# Patient Record
Sex: FEMALE | Race: WHITE | Employment: FULL TIME | URBAN - METROPOLITAN AREA
[De-identification: names, ages, dates, MRNs, and addresses within clinical notes are randomized per-mention and may not be internally consistent; named-entity substitution may affect disease eponyms.]

---

## 2017-01-26 RX ORDER — HYDROCODONE BITARTRATE AND ACETAMINOPHEN 5; 325 MG/1; MG/1
1 TABLET ORAL 2 TIMES DAILY PRN
Qty: 60 TABLET | Refills: 0 | Status: SHIPPED | OUTPATIENT
Start: 2017-01-26 | End: 2017-04-07 | Stop reason: SDUPTHER

## 2017-01-26 RX ORDER — CYCLOBENZAPRINE HCL 5 MG
5 TABLET ORAL 2 TIMES DAILY PRN
Qty: 60 TABLET | Refills: 1 | Status: SHIPPED | OUTPATIENT
Start: 2017-01-26 | End: 2020-11-25 | Stop reason: ALTCHOICE

## 2017-01-27 ENCOUNTER — TELEPHONE (OUTPATIENT)
Dept: INTERNAL MEDICINE CLINIC | Age: 57
End: 2017-01-27

## 2017-01-27 RX ORDER — AZITHROMYCIN 250 MG/1
TABLET, FILM COATED ORAL
Qty: 1 PACKET | Refills: 0 | Status: SHIPPED | OUTPATIENT
Start: 2017-01-27 | End: 2017-02-06

## 2017-02-04 ENCOUNTER — HOSPITAL ENCOUNTER (OUTPATIENT)
Dept: LAB | Age: 57
Discharge: HOME | End: 2017-02-04
Attending: INTERNAL MEDICINE | Admitting: INTERNAL MEDICINE

## 2017-02-04 LAB
ALBUMIN SERPL-MCNC: 4.4 GM/DL (ref 3.4–5)
ALP BLD-CCNC: 111 IU/L (ref 40–129)
ALT SERPL-CCNC: 20 U/L (ref 10–40)
ANION GAP SERPL CALCULATED.3IONS-SCNC: 14 MMOL/L (ref 4–16)
AST SERPL-CCNC: 23 IU/L (ref 15–37)
BASOPHILS ABSOLUTE: 0.1 K/CU MM
BASOPHILS RELATIVE PERCENT: 1 % (ref 0–1)
BILIRUB SERPL-MCNC: 0.3 MG/DL (ref 0–1)
BILIRUBIN DIRECT: 0.2 MG/DL (ref 0–0.3)
BILIRUBIN, INDIRECT: 0.1 MG/DL (ref 0–0.7)
BUN BLDV-MCNC: 19 MG/DL (ref 6–23)
CALCIUM SERPL-MCNC: 9.8 MG/DL (ref 8.3–10.6)
CHLORIDE BLD-SCNC: 99 MMOL/L (ref 99–110)
CHOLESTEROL: 199 MG/DL
CO2: 28 MMOL/L (ref 21–32)
CREAT SERPL-MCNC: 0.8 MG/DL (ref 0.6–1.1)
DIFFERENTIAL TYPE: ABNORMAL
EOSINOPHILS ABSOLUTE: 0.2 K/CU MM
EOSINOPHILS RELATIVE PERCENT: 2.8 % (ref 0–3)
GFR AFRICAN AMERICAN: >60 ML/MIN/1.73M2
GFR NON-AFRICAN AMERICAN: >60 ML/MIN/1.73M2
GLUCOSE BLD-MCNC: 102 MG/DL (ref 70–140)
HCT VFR BLD CALC: 47.4 % (ref 37–47)
HDLC SERPL-MCNC: 61 MG/DL
HEMOGLOBIN: 14.9 GM/DL (ref 12.5–16)
IMMATURE NEUTROPHIL %: 0.5 % (ref 0–0.43)
LDL CHOLESTEROL DIRECT: 129 MG/DL
LYMPHOCYTES ABSOLUTE: 2.5 K/CU MM
LYMPHOCYTES RELATIVE PERCENT: 40.2 % (ref 24–44)
MCH RBC QN AUTO: 28.5 PG (ref 27–31)
MCHC RBC AUTO-ENTMCNC: 31.4 % (ref 32–36)
MCV RBC AUTO: 90.8 FL (ref 78–100)
MONOCYTES ABSOLUTE: 0.5 K/CU MM
MONOCYTES RELATIVE PERCENT: 8.9 % (ref 0–4)
NUCLEATED RBC %: 0 %
PDW BLD-RTO: 13.3 % (ref 11.7–14.9)
PLATELET # BLD: 432 K/CU MM (ref 140–440)
PMV BLD AUTO: 9.3 FL (ref 7.5–11.1)
POTASSIUM SERPL-SCNC: 4.3 MMOL/L (ref 3.5–5.1)
RBC # BLD: 5.22 M/CU MM (ref 4.2–5.4)
SEGMENTED NEUTROPHILS ABSOLUTE COUNT: 2.8 K/CU MM
SEGMENTED NEUTROPHILS RELATIVE PERCENT: 46.6 % (ref 36–66)
SODIUM BLD-SCNC: 141 MMOL/L (ref 135–145)
TOTAL IMMATURE NEUTOROPHIL: 0.03 K/CU MM
TOTAL NUCLEATED RBC: 0 K/CU MM
TOTAL PROTEIN: 8 GM/DL (ref 6.4–8.2)
TRIGL SERPL-MCNC: 110 MG/DL
TSH HIGH SENSITIVITY: 3.84 UIU/ML (ref 0.27–4.2)
VITAMIN D 25-HYDROXY: >60 NG/ML
WBC # BLD: 6.1 K/CU MM (ref 4–10.5)

## 2017-02-07 ENCOUNTER — OFFICE VISIT (OUTPATIENT)
Dept: INTERNAL MEDICINE CLINIC | Age: 57
End: 2017-02-07

## 2017-02-07 DIAGNOSIS — F41.1 GENERALIZED ANXIETY DISORDER: Primary | ICD-10-CM

## 2017-02-07 DIAGNOSIS — E03.4 HYPOTHYROIDISM DUE TO ACQUIRED ATROPHY OF THYROID: ICD-10-CM

## 2017-02-07 DIAGNOSIS — L98.9 ARM SKIN LESION, RIGHT: ICD-10-CM

## 2017-02-07 DIAGNOSIS — E78.2 MIXED HYPERLIPIDEMIA: ICD-10-CM

## 2017-02-07 DIAGNOSIS — E55.9 VITAMIN D DEFICIENCY: ICD-10-CM

## 2017-02-07 PROCEDURE — 99213 OFFICE O/P EST LOW 20 MIN: CPT | Performed by: INTERNAL MEDICINE

## 2017-02-14 VITALS
BODY MASS INDEX: 25.78 KG/M2 | SYSTOLIC BLOOD PRESSURE: 140 MMHG | RESPIRATION RATE: 14 BRPM | WEIGHT: 150.2 LBS | DIASTOLIC BLOOD PRESSURE: 90 MMHG | HEART RATE: 64 BPM

## 2017-04-10 RX ORDER — SUMATRIPTAN 100 MG/1
100 TABLET, FILM COATED ORAL
Qty: 4 TABLET | Refills: 5 | Status: SHIPPED | OUTPATIENT
Start: 2017-04-10 | End: 2017-09-12 | Stop reason: SDUPTHER

## 2017-04-10 RX ORDER — HYDROCODONE BITARTRATE AND ACETAMINOPHEN 5; 325 MG/1; MG/1
1 TABLET ORAL 2 TIMES DAILY PRN
Qty: 60 TABLET | Refills: 0 | Status: SHIPPED | OUTPATIENT
Start: 2017-04-10 | End: 2017-07-17 | Stop reason: SDUPTHER

## 2017-04-27 RX ORDER — IBUPROFEN 600 MG/1
TABLET ORAL
Qty: 180 TABLET | Refills: 0 | Status: SHIPPED | OUTPATIENT
Start: 2017-04-27 | End: 2017-08-01 | Stop reason: SDUPTHER

## 2017-06-21 RX ORDER — PHENAZOPYRIDINE HYDROCHLORIDE 100 MG/1
100 TABLET, FILM COATED ORAL 3 TIMES DAILY PRN
Qty: 30 TABLET | Refills: 0 | Status: SHIPPED | OUTPATIENT
Start: 2017-06-21 | End: 2017-09-12 | Stop reason: SDUPTHER

## 2017-07-17 RX ORDER — HYDROCODONE BITARTRATE AND ACETAMINOPHEN 5; 325 MG/1; MG/1
1 TABLET ORAL 2 TIMES DAILY PRN
Qty: 60 TABLET | Refills: 0 | Status: SHIPPED | OUTPATIENT
Start: 2017-07-17 | End: 2017-09-12 | Stop reason: SDUPTHER

## 2017-07-17 RX ORDER — LORAZEPAM 0.5 MG/1
1 TABLET ORAL NIGHTLY
Qty: 30 TABLET | Refills: 2 | Status: SHIPPED | OUTPATIENT
Start: 2017-07-17 | End: 2018-03-07 | Stop reason: SDUPTHER

## 2017-07-27 ENCOUNTER — TELEPHONE (OUTPATIENT)
Dept: INTERNAL MEDICINE CLINIC | Age: 57
End: 2017-07-27

## 2017-07-27 DIAGNOSIS — E78.2 MIXED HYPERLIPIDEMIA: ICD-10-CM

## 2017-07-27 DIAGNOSIS — D50.9 IRON DEFICIENCY ANEMIA, UNSPECIFIED IRON DEFICIENCY ANEMIA TYPE: Primary | ICD-10-CM

## 2017-07-27 DIAGNOSIS — E03.4 HYPOTHYROIDISM DUE TO ACQUIRED ATROPHY OF THYROID: ICD-10-CM

## 2017-08-01 RX ORDER — IBUPROFEN 600 MG/1
TABLET ORAL
Qty: 180 TABLET | Refills: 0 | Status: SHIPPED | OUTPATIENT
Start: 2017-08-01 | End: 2017-11-11 | Stop reason: SDUPTHER

## 2017-09-07 ENCOUNTER — HOSPITAL ENCOUNTER (OUTPATIENT)
Dept: OTHER | Age: 57
Discharge: HOME | End: 2017-09-07
Attending: INTERNAL MEDICINE | Admitting: INTERNAL MEDICINE

## 2017-09-07 LAB
ALBUMIN SERPL-MCNC: 4.1 GM/DL (ref 3.4–5)
ALP BLD-CCNC: 80 IU/L (ref 40–129)
ALT SERPL-CCNC: 15 U/L (ref 10–40)
ANION GAP SERPL CALCULATED.3IONS-SCNC: 12 MMOL/L (ref 4–16)
AST SERPL-CCNC: 22 IU/L (ref 15–37)
BASOPHILS ABSOLUTE: 0.1 K/CU MM
BASOPHILS RELATIVE PERCENT: 0.8 % (ref 0–1)
BILIRUB SERPL-MCNC: 0.3 MG/DL (ref 0–1)
BILIRUBIN DIRECT: 0.2 MG/DL (ref 0–0.3)
BILIRUBIN, INDIRECT: 0.1 MG/DL (ref 0–0.7)
BUN BLDV-MCNC: 18 MG/DL (ref 6–23)
CALCIUM SERPL-MCNC: 9.6 MG/DL (ref 8.3–10.6)
CHLORIDE BLD-SCNC: 99 MMOL/L (ref 99–110)
CHOLESTEROL: 189 MG/DL
CO2: 27 MMOL/L (ref 21–32)
CREAT SERPL-MCNC: 0.8 MG/DL (ref 0.6–1.1)
DIFFERENTIAL TYPE: ABNORMAL
EOSINOPHILS ABSOLUTE: 0.2 K/CU MM
EOSINOPHILS RELATIVE PERCENT: 2.3 % (ref 0–3)
GFR AFRICAN AMERICAN: >60 ML/MIN/1.73M2
GFR NON-AFRICAN AMERICAN: >60 ML/MIN/1.73M2
GLUCOSE BLD-MCNC: 99 MG/DL (ref 70–140)
HCT VFR BLD CALC: 35.5 % (ref 37–47)
HDLC SERPL-MCNC: 68 MG/DL
HEMOGLOBIN: 10.5 GM/DL (ref 12.5–16)
IMMATURE NEUTROPHIL %: 0.4 % (ref 0–0.43)
LDL CHOLESTEROL DIRECT: 114 MG/DL
LYMPHOCYTES ABSOLUTE: 1.5 K/CU MM
LYMPHOCYTES RELATIVE PERCENT: 16.5 % (ref 24–44)
MCH RBC QN AUTO: 22.1 PG (ref 27–31)
MCHC RBC AUTO-ENTMCNC: 29.6 % (ref 32–36)
MCV RBC AUTO: 74.6 FL (ref 78–100)
MONOCYTES ABSOLUTE: 0.6 K/CU MM
MONOCYTES RELATIVE PERCENT: 6.6 % (ref 0–4)
NUCLEATED RBC %: 0 %
PDW BLD-RTO: 16.2 % (ref 11.7–14.9)
PLATELET # BLD: 429 K/CU MM (ref 140–440)
PMV BLD AUTO: 9 FL (ref 7.5–11.1)
POTASSIUM SERPL-SCNC: 5 MMOL/L (ref 3.5–5.1)
RBC # BLD: 4.76 M/CU MM (ref 4.2–5.4)
SEGMENTED NEUTROPHILS ABSOLUTE COUNT: 6.7 K/CU MM
SEGMENTED NEUTROPHILS RELATIVE PERCENT: 73.4 % (ref 36–66)
SODIUM BLD-SCNC: 138 MMOL/L (ref 135–145)
TOTAL IMMATURE NEUTOROPHIL: 0.04 K/CU MM
TOTAL NUCLEATED RBC: 0 K/CU MM
TOTAL PROTEIN: 7.5 GM/DL (ref 6.4–8.2)
TRIGL SERPL-MCNC: 112 MG/DL
TSH HIGH SENSITIVITY: 1.58 UIU/ML (ref 0.27–4.2)
WBC # BLD: 9.1 K/CU MM (ref 4–10.5)

## 2017-09-12 ENCOUNTER — OFFICE VISIT (OUTPATIENT)
Dept: INTERNAL MEDICINE CLINIC | Age: 57
End: 2017-09-12

## 2017-09-12 VITALS
HEART RATE: 88 BPM | WEIGHT: 147 LBS | BODY MASS INDEX: 25.23 KG/M2 | DIASTOLIC BLOOD PRESSURE: 80 MMHG | SYSTOLIC BLOOD PRESSURE: 135 MMHG

## 2017-09-12 DIAGNOSIS — E78.2 MIXED HYPERLIPIDEMIA: ICD-10-CM

## 2017-09-12 DIAGNOSIS — R31.9 HEMATURIA: ICD-10-CM

## 2017-09-12 DIAGNOSIS — G89.29 OTHER CHRONIC PAIN: ICD-10-CM

## 2017-09-12 DIAGNOSIS — D50.9 IRON DEFICIENCY ANEMIA, UNSPECIFIED IRON DEFICIENCY ANEMIA TYPE: Primary | ICD-10-CM

## 2017-09-12 DIAGNOSIS — E03.4 HYPOTHYROIDISM DUE TO ACQUIRED ATROPHY OF THYROID: ICD-10-CM

## 2017-09-12 DIAGNOSIS — M81.0 AGE-RELATED OSTEOPOROSIS WITHOUT CURRENT PATHOLOGICAL FRACTURE: ICD-10-CM

## 2017-09-12 LAB
BASOPHILS ABSOLUTE: 0.1 K/UL (ref 0–0.2)
BASOPHILS RELATIVE PERCENT: 1.1 %
BILIRUBIN URINE: NEGATIVE
BLOOD, URINE: NEGATIVE
CLARITY: CLEAR
COLOR: YELLOW
EOSINOPHILS ABSOLUTE: 0.1 K/UL (ref 0–0.6)
EOSINOPHILS RELATIVE PERCENT: 1.8 %
EPITHELIAL CELLS, UA: 0 /HPF (ref 0–5)
FERRITIN: 7.1 NG/ML (ref 15–150)
GLUCOSE URINE: NEGATIVE MG/DL
HCT VFR BLD CALC: 34.8 % (ref 36–48)
HEMOGLOBIN: 10.7 G/DL (ref 12–16)
HYALINE CASTS: 0 /LPF (ref 0–8)
IRON: 22 UG/DL (ref 37–145)
KETONES, URINE: NEGATIVE MG/DL
LEUKOCYTE ESTERASE, URINE: NEGATIVE
LYMPHOCYTES ABSOLUTE: 1.8 K/UL (ref 1–5.1)
LYMPHOCYTES RELATIVE PERCENT: 28 %
MCH RBC QN AUTO: 21.6 PG (ref 26–34)
MCHC RBC AUTO-ENTMCNC: 30.7 G/DL (ref 31–36)
MCV RBC AUTO: 70.3 FL (ref 80–100)
MICROSCOPIC EXAMINATION: YES
MONOCYTES ABSOLUTE: 0.6 K/UL (ref 0–1.3)
MONOCYTES RELATIVE PERCENT: 9.3 %
NEUTROPHILS ABSOLUTE: 3.9 K/UL (ref 1.7–7.7)
NEUTROPHILS RELATIVE PERCENT: 59.8 %
NITRITE, URINE: POSITIVE
PDW BLD-RTO: 18 % (ref 12.4–15.4)
PH UA: 6.5
PLATELET # BLD: 449 K/UL (ref 135–450)
PMV BLD AUTO: 7.8 FL (ref 5–10.5)
PROTEIN UA: NEGATIVE MG/DL
RBC # BLD: 4.95 M/UL (ref 4–5.2)
RBC UA: 2 /HPF (ref 0–4)
SPECIFIC GRAVITY UA: 1.01
UROBILINOGEN, URINE: 0.2 E.U./DL
WBC # BLD: 6.5 K/UL (ref 4–11)
WBC UA: 0 /HPF (ref 0–5)

## 2017-09-12 PROCEDURE — 99213 OFFICE O/P EST LOW 20 MIN: CPT | Performed by: INTERNAL MEDICINE

## 2017-09-12 PROCEDURE — 81001 URINALYSIS AUTO W/SCOPE: CPT | Performed by: INTERNAL MEDICINE

## 2017-09-12 PROCEDURE — 36415 COLL VENOUS BLD VENIPUNCTURE: CPT | Performed by: INTERNAL MEDICINE

## 2017-09-12 RX ORDER — HYDROCODONE BITARTRATE AND ACETAMINOPHEN 5; 325 MG/1; MG/1
1 TABLET ORAL 2 TIMES DAILY PRN
Qty: 60 TABLET | Refills: 0 | Status: SHIPPED | OUTPATIENT
Start: 2017-09-12 | End: 2017-11-21 | Stop reason: SDUPTHER

## 2017-09-12 RX ORDER — DICYCLOMINE HYDROCHLORIDE 10 MG/1
10 CAPSULE ORAL 4 TIMES DAILY
Qty: 120 CAPSULE | Refills: 3 | Status: SHIPPED | OUTPATIENT
Start: 2017-09-12 | End: 2018-10-12 | Stop reason: SDUPTHER

## 2017-09-12 RX ORDER — OMEPRAZOLE 40 MG/1
40 CAPSULE, DELAYED RELEASE ORAL DAILY
Qty: 30 CAPSULE | Refills: 3 | Status: SHIPPED | OUTPATIENT
Start: 2017-09-12 | End: 2018-01-12 | Stop reason: SDUPTHER

## 2017-09-12 RX ORDER — LEVOTHYROXINE SODIUM 0.07 MG/1
TABLET ORAL
Qty: 90 TABLET | Refills: 3 | Status: SHIPPED | OUTPATIENT
Start: 2017-09-12 | End: 2018-04-30 | Stop reason: SDUPTHER

## 2017-09-12 RX ORDER — SUMATRIPTAN 100 MG/1
100 TABLET, FILM COATED ORAL
Qty: 4 TABLET | Refills: 5 | Status: SHIPPED | OUTPATIENT
Start: 2017-09-12 | End: 2018-04-30 | Stop reason: SDUPTHER

## 2017-09-12 RX ORDER — PHENAZOPYRIDINE HYDROCHLORIDE 100 MG/1
100 TABLET, FILM COATED ORAL 3 TIMES DAILY PRN
Qty: 30 TABLET | Refills: 0 | Status: SHIPPED | OUTPATIENT
Start: 2017-09-12 | End: 2018-02-01 | Stop reason: SDUPTHER

## 2017-09-19 ENCOUNTER — TELEPHONE (OUTPATIENT)
Dept: INTERNAL MEDICINE CLINIC | Age: 57
End: 2017-09-19

## 2017-09-19 DIAGNOSIS — D50.9 IRON DEFICIENCY ANEMIA, UNSPECIFIED IRON DEFICIENCY ANEMIA TYPE: Primary | ICD-10-CM

## 2017-11-03 ENCOUNTER — TELEPHONE (OUTPATIENT)
Dept: INTERNAL MEDICINE CLINIC | Age: 57
End: 2017-11-03

## 2017-11-03 RX ORDER — FLUCONAZOLE 150 MG/1
150 TABLET ORAL ONCE
Qty: 1 TABLET | Refills: 0 | Status: SHIPPED | OUTPATIENT
Start: 2017-11-03 | End: 2017-11-03

## 2017-11-03 NOTE — TELEPHONE ENCOUNTER
Susan Mckenzie called and stated that she has been in her hot tub to much and now has a yeast infection.      W[facility]EM

## 2017-11-13 RX ORDER — IBUPROFEN 600 MG/1
TABLET ORAL
Qty: 180 TABLET | Refills: 0 | Status: SHIPPED | OUTPATIENT
Start: 2017-11-13 | End: 2018-05-15 | Stop reason: CLARIF

## 2017-11-21 RX ORDER — HYDROCODONE BITARTRATE AND ACETAMINOPHEN 5; 325 MG/1; MG/1
1 TABLET ORAL 2 TIMES DAILY PRN
Qty: 60 TABLET | Refills: 0 | Status: SHIPPED | OUTPATIENT
Start: 2017-11-21 | End: 2018-02-01 | Stop reason: SDUPTHER

## 2018-01-11 ENCOUNTER — TELEPHONE (OUTPATIENT)
Dept: INTERNAL MEDICINE CLINIC | Age: 58
End: 2018-01-11

## 2018-01-11 RX ORDER — OSELTAMIVIR PHOSPHATE 75 MG/1
75 CAPSULE ORAL 2 TIMES DAILY
Qty: 10 CAPSULE | Refills: 0 | Status: SHIPPED | OUTPATIENT
Start: 2018-01-11 | End: 2018-01-16

## 2018-01-11 RX ORDER — BENZONATATE 100 MG/1
100 CAPSULE ORAL 3 TIMES DAILY PRN
Qty: 21 CAPSULE | Refills: 1 | Status: SHIPPED | OUTPATIENT
Start: 2018-01-11 | End: 2018-01-18

## 2018-01-11 NOTE — TELEPHONE ENCOUNTER
Karlos Moise has been sick for the last few days and has been having fever, chills, body aches, cough    [facility] EM

## 2018-01-15 NOTE — TELEPHONE ENCOUNTER
Patient called and stated she is till not feeling well and need one more day off Dr. Misha Bruce stated that it was ok.

## 2018-02-01 DIAGNOSIS — M48.02 CERVICAL SPINAL STENOSIS: Primary | ICD-10-CM

## 2018-02-02 RX ORDER — PHENAZOPYRIDINE HYDROCHLORIDE 100 MG/1
100 TABLET, FILM COATED ORAL 3 TIMES DAILY PRN
Qty: 30 TABLET | Refills: 0 | Status: SHIPPED | OUTPATIENT
Start: 2018-02-02 | End: 2018-06-20 | Stop reason: SDUPTHER

## 2018-02-02 RX ORDER — HYDROCODONE BITARTRATE AND ACETAMINOPHEN 5; 325 MG/1; MG/1
1 TABLET ORAL 2 TIMES DAILY PRN
Qty: 60 TABLET | Refills: 0 | Status: SHIPPED | OUTPATIENT
Start: 2018-02-02 | End: 2018-04-02 | Stop reason: SDUPTHER

## 2018-02-12 RX ORDER — SUMATRIPTAN 100 MG/1
100 TABLET, FILM COATED ORAL
Qty: 4 TABLET | Refills: 2 | Status: SHIPPED | OUTPATIENT
Start: 2018-02-12 | End: 2018-04-30

## 2018-03-01 ENCOUNTER — TELEPHONE (OUTPATIENT)
Dept: INTERNAL MEDICINE CLINIC | Age: 58
End: 2018-03-01

## 2018-03-07 RX ORDER — LORAZEPAM 0.5 MG/1
0.5 TABLET ORAL NIGHTLY
Qty: 30 TABLET | Refills: 2 | Status: SHIPPED | OUTPATIENT
Start: 2018-03-07 | End: 2018-06-05

## 2018-04-02 ENCOUNTER — PATIENT MESSAGE (OUTPATIENT)
Dept: INTERNAL MEDICINE CLINIC | Age: 58
End: 2018-04-02

## 2018-04-02 DIAGNOSIS — M48.02 CERVICAL SPINAL STENOSIS: ICD-10-CM

## 2018-04-02 RX ORDER — HYDROCODONE BITARTRATE AND ACETAMINOPHEN 5; 325 MG/1; MG/1
1 TABLET ORAL 2 TIMES DAILY PRN
Qty: 60 TABLET | Refills: 0 | Status: SHIPPED | OUTPATIENT
Start: 2018-04-02 | End: 2018-05-02

## 2018-04-19 ENCOUNTER — TELEPHONE (OUTPATIENT)
Dept: INTERNAL MEDICINE CLINIC | Age: 58
End: 2018-04-19

## 2018-04-19 RX ORDER — AZITHROMYCIN 250 MG/1
TABLET, FILM COATED ORAL
Qty: 1 PACKET | Refills: 0 | Status: SHIPPED | OUTPATIENT
Start: 2018-04-19 | End: 2018-04-29

## 2018-04-30 ENCOUNTER — OFFICE VISIT (OUTPATIENT)
Dept: INTERNAL MEDICINE CLINIC | Age: 58
End: 2018-04-30

## 2018-04-30 VITALS
SYSTOLIC BLOOD PRESSURE: 120 MMHG | RESPIRATION RATE: 15 BRPM | HEART RATE: 105 BPM | BODY MASS INDEX: 24.92 KG/M2 | DIASTOLIC BLOOD PRESSURE: 72 MMHG | WEIGHT: 145.2 LBS

## 2018-04-30 DIAGNOSIS — F41.1 GENERALIZED ANXIETY DISORDER: ICD-10-CM

## 2018-04-30 DIAGNOSIS — J98.01 BRONCHOSPASM: ICD-10-CM

## 2018-04-30 DIAGNOSIS — J01.00 ACUTE NON-RECURRENT MAXILLARY SINUSITIS: ICD-10-CM

## 2018-04-30 DIAGNOSIS — J40 BRONCHITIS: Primary | ICD-10-CM

## 2018-04-30 PROCEDURE — 99213 OFFICE O/P EST LOW 20 MIN: CPT | Performed by: INTERNAL MEDICINE

## 2018-04-30 RX ORDER — PREDNISONE 10 MG/1
10 TABLET ORAL 2 TIMES DAILY
Qty: 14 TABLET | Refills: 0 | Status: SHIPPED | OUTPATIENT
Start: 2018-04-30 | End: 2018-05-07

## 2018-04-30 RX ORDER — SUMATRIPTAN 100 MG/1
100 TABLET, FILM COATED ORAL
Qty: 4 TABLET | Refills: 2 | Status: SHIPPED | OUTPATIENT
Start: 2018-04-30 | End: 2018-06-22 | Stop reason: SDUPTHER

## 2018-04-30 RX ORDER — DOXYCYCLINE HYCLATE 100 MG
100 TABLET ORAL 2 TIMES DAILY
Qty: 14 TABLET | Refills: 0 | Status: SHIPPED | OUTPATIENT
Start: 2018-04-30 | End: 2018-05-07

## 2018-04-30 ASSESSMENT — PATIENT HEALTH QUESTIONNAIRE - PHQ9
2. FEELING DOWN, DEPRESSED OR HOPELESS: 0
SUM OF ALL RESPONSES TO PHQ QUESTIONS 1-9: 0
SUM OF ALL RESPONSES TO PHQ9 QUESTIONS 1 & 2: 0
1. LITTLE INTEREST OR PLEASURE IN DOING THINGS: 0

## 2018-05-02 DIAGNOSIS — E03.4 HYPOTHYROIDISM DUE TO ACQUIRED ATROPHY OF THYROID: ICD-10-CM

## 2018-05-02 DIAGNOSIS — D50.9 IRON DEFICIENCY ANEMIA, UNSPECIFIED IRON DEFICIENCY ANEMIA TYPE: ICD-10-CM

## 2018-05-02 DIAGNOSIS — E78.2 MIXED HYPERLIPIDEMIA: Primary | ICD-10-CM

## 2018-05-13 ENCOUNTER — HOSPITAL ENCOUNTER (OUTPATIENT)
Dept: LAB | Age: 58
Discharge: HOME | End: 2018-05-13
Attending: INTERNAL MEDICINE | Admitting: INTERNAL MEDICINE

## 2018-05-13 LAB
ALBUMIN SERPL-MCNC: 4.3 GM/DL (ref 3.4–5)
ALP BLD-CCNC: 84 IU/L (ref 40–129)
ALT SERPL-CCNC: 17 U/L (ref 10–40)
ANION GAP SERPL CALCULATED.3IONS-SCNC: 11 MMOL/L (ref 4–16)
AST SERPL-CCNC: 22 IU/L (ref 15–37)
BASOPHILS ABSOLUTE: 0.1 K/CU MM
BASOPHILS RELATIVE PERCENT: 1 % (ref 0–1)
BILIRUB SERPL-MCNC: 0.3 MG/DL (ref 0–1)
BILIRUBIN DIRECT: 0.2 MG/DL (ref 0–0.3)
BILIRUBIN, INDIRECT: 0.1 MG/DL (ref 0–0.7)
BUN BLDV-MCNC: 16 MG/DL (ref 6–23)
CALCIUM SERPL-MCNC: 9.8 MG/DL (ref 8.3–10.6)
CHLORIDE BLD-SCNC: 99 MMOL/L (ref 99–110)
CHOLESTEROL: 188 MG/DL
CO2: 29 MMOL/L (ref 21–32)
CREAT SERPL-MCNC: 0.7 MG/DL (ref 0.6–1.1)
DIFFERENTIAL TYPE: ABNORMAL
EOSINOPHILS ABSOLUTE: 0.1 K/CU MM
EOSINOPHILS RELATIVE PERCENT: 1.3 % (ref 0–3)
FERRITIN: 29 NG/ML (ref 15–150)
GFR AFRICAN AMERICAN: >60 ML/MIN/1.73M2
GFR NON-AFRICAN AMERICAN: >60 ML/MIN/1.73M2
GLUCOSE BLD-MCNC: 96 MG/DL (ref 70–99)
HCT VFR BLD CALC: 46.8 % (ref 37–47)
HDLC SERPL-MCNC: 74 MG/DL
HEMOGLOBIN: 15.4 GM/DL (ref 12.5–16)
IMMATURE NEUTROPHIL %: 0.4 % (ref 0–0.43)
IRON: 93 UG/DL (ref 37–145)
LDL CHOLESTEROL DIRECT: 115 MG/DL
LYMPHOCYTES ABSOLUTE: 2 K/CU MM
LYMPHOCYTES RELATIVE PERCENT: 25.6 % (ref 24–44)
MCH RBC QN AUTO: 30.7 PG (ref 27–31)
MCHC RBC AUTO-ENTMCNC: 32.9 % (ref 32–36)
MCV RBC AUTO: 93.2 FL (ref 78–100)
MONOCYTES ABSOLUTE: 0.8 K/CU MM
MONOCYTES RELATIVE PERCENT: 9.9 % (ref 0–4)
NUCLEATED RBC %: 0 %
PDW BLD-RTO: 12.2 % (ref 11.7–14.9)
PLATELET # BLD: 406 K/CU MM (ref 140–440)
PMV BLD AUTO: 9.3 FL (ref 7.5–11.1)
POTASSIUM SERPL-SCNC: 4.5 MMOL/L (ref 3.5–5.1)
RBC # BLD: 5.02 M/CU MM (ref 4.2–5.4)
SEGMENTED NEUTROPHILS ABSOLUTE COUNT: 4.9 K/CU MM
SEGMENTED NEUTROPHILS RELATIVE PERCENT: 61.8 % (ref 36–66)
SODIUM BLD-SCNC: 139 MMOL/L (ref 135–145)
TOTAL IMMATURE NEUTOROPHIL: 0.03 K/CU MM
TOTAL NUCLEATED RBC: 0 K/CU MM
TOTAL PROTEIN: 7.2 GM/DL (ref 6.4–8.2)
TRIGL SERPL-MCNC: 118 MG/DL
TSH HIGH SENSITIVITY: 2.33 UIU/ML (ref 0.27–4.2)
WBC # BLD: 7.9 K/CU MM (ref 4–10.5)

## 2018-05-15 ENCOUNTER — OFFICE VISIT (OUTPATIENT)
Dept: INTERNAL MEDICINE CLINIC | Age: 58
End: 2018-05-15

## 2018-05-15 VITALS
WEIGHT: 145.6 LBS | SYSTOLIC BLOOD PRESSURE: 130 MMHG | BODY MASS INDEX: 24.99 KG/M2 | DIASTOLIC BLOOD PRESSURE: 70 MMHG | HEART RATE: 88 BPM

## 2018-05-15 DIAGNOSIS — E03.4 HYPOTHYROIDISM DUE TO ACQUIRED ATROPHY OF THYROID: ICD-10-CM

## 2018-05-15 DIAGNOSIS — K21.9 GASTROESOPHAGEAL REFLUX DISEASE WITHOUT ESOPHAGITIS: ICD-10-CM

## 2018-05-15 DIAGNOSIS — E78.2 MIXED HYPERLIPIDEMIA: ICD-10-CM

## 2018-05-15 DIAGNOSIS — J01.00 ACUTE NON-RECURRENT MAXILLARY SINUSITIS: Primary | ICD-10-CM

## 2018-05-15 PROCEDURE — 99213 OFFICE O/P EST LOW 20 MIN: CPT | Performed by: INTERNAL MEDICINE

## 2018-05-15 RX ORDER — DOXYCYCLINE HYCLATE 100 MG
100 TABLET ORAL 2 TIMES DAILY
Qty: 28 TABLET | Refills: 0 | Status: SHIPPED | OUTPATIENT
Start: 2018-05-15 | End: 2018-05-29

## 2018-06-05 DIAGNOSIS — M48.02 CERVICAL SPINAL STENOSIS: ICD-10-CM

## 2018-06-05 RX ORDER — HYDROCODONE BITARTRATE AND ACETAMINOPHEN 5; 325 MG/1; MG/1
1 TABLET ORAL 2 TIMES DAILY PRN
Qty: 60 TABLET | Refills: 0 | Status: SHIPPED | OUTPATIENT
Start: 2018-06-05 | End: 2018-07-30 | Stop reason: SDUPTHER

## 2018-06-20 ENCOUNTER — TELEPHONE (OUTPATIENT)
Dept: INTERNAL MEDICINE CLINIC | Age: 58
End: 2018-06-20

## 2018-06-20 RX ORDER — PHENAZOPYRIDINE HYDROCHLORIDE 100 MG/1
100 TABLET, FILM COATED ORAL 3 TIMES DAILY PRN
Qty: 30 TABLET | Refills: 0 | Status: SHIPPED | OUTPATIENT
Start: 2018-06-20 | End: 2018-10-10 | Stop reason: SDUPTHER

## 2018-06-20 RX ORDER — ONDANSETRON 4 MG/1
4 TABLET, FILM COATED ORAL EVERY 8 HOURS PRN
Qty: 30 TABLET | Refills: 0 | Status: SHIPPED | OUTPATIENT
Start: 2018-06-20 | End: 2020-07-05 | Stop reason: SDUPTHER

## 2018-06-22 RX ORDER — SUMATRIPTAN 100 MG/1
100 TABLET, FILM COATED ORAL
Qty: 4 TABLET | Refills: 2 | Status: SHIPPED | OUTPATIENT
Start: 2018-06-22 | End: 2018-09-27 | Stop reason: SDUPTHER

## 2018-07-30 DIAGNOSIS — M48.02 CERVICAL SPINAL STENOSIS: ICD-10-CM

## 2018-07-30 RX ORDER — HYDROCODONE BITARTRATE AND ACETAMINOPHEN 5; 325 MG/1; MG/1
1 TABLET ORAL 2 TIMES DAILY PRN
Qty: 60 TABLET | Refills: 0 | Status: SHIPPED | OUTPATIENT
Start: 2018-07-30 | End: 2018-09-13 | Stop reason: SDUPTHER

## 2018-08-07 RX ORDER — OMEPRAZOLE 40 MG/1
CAPSULE, DELAYED RELEASE ORAL
Qty: 90 CAPSULE | Refills: 3 | Status: SHIPPED | OUTPATIENT
Start: 2018-08-07 | End: 2019-08-29 | Stop reason: SDUPTHER

## 2018-09-13 ENCOUNTER — TELEPHONE (OUTPATIENT)
Dept: INTERNAL MEDICINE CLINIC | Age: 58
End: 2018-09-13

## 2018-09-13 DIAGNOSIS — M48.02 CERVICAL SPINAL STENOSIS: ICD-10-CM

## 2018-09-13 RX ORDER — LEVOTHYROXINE SODIUM 0.07 MG/1
TABLET ORAL
Qty: 90 TABLET | Refills: 3 | Status: SHIPPED | OUTPATIENT
Start: 2018-09-13 | End: 2020-07-05 | Stop reason: SDUPTHER

## 2018-09-13 RX ORDER — HYDROCODONE BITARTRATE AND ACETAMINOPHEN 5; 325 MG/1; MG/1
1 TABLET ORAL 2 TIMES DAILY PRN
Qty: 60 TABLET | Refills: 0 | Status: SHIPPED | OUTPATIENT
Start: 2018-09-13 | End: 2018-11-06 | Stop reason: SDUPTHER

## 2018-09-27 RX ORDER — SUMATRIPTAN 100 MG/1
100 TABLET, FILM COATED ORAL
Qty: 4 TABLET | Refills: 2 | Status: SHIPPED | OUTPATIENT
Start: 2018-09-27 | End: 2018-11-29 | Stop reason: SDUPTHER

## 2018-10-10 RX ORDER — PHENAZOPYRIDINE HYDROCHLORIDE 100 MG/1
100 TABLET, FILM COATED ORAL 3 TIMES DAILY PRN
Qty: 30 TABLET | Refills: 0 | Status: SHIPPED | OUTPATIENT
Start: 2018-10-10 | End: 2019-02-15 | Stop reason: SDUPTHER

## 2018-10-12 RX ORDER — DICYCLOMINE HYDROCHLORIDE 10 MG/1
10 CAPSULE ORAL 4 TIMES DAILY
Qty: 120 CAPSULE | Refills: 3 | Status: SHIPPED | OUTPATIENT
Start: 2018-10-12 | End: 2021-07-28 | Stop reason: SDUPTHER

## 2018-11-06 ENCOUNTER — TELEPHONE (OUTPATIENT)
Dept: INTERNAL MEDICINE CLINIC | Age: 58
End: 2018-11-06

## 2018-11-06 DIAGNOSIS — M48.02 CERVICAL SPINAL STENOSIS: ICD-10-CM

## 2018-11-06 DIAGNOSIS — E74.39 GLUCOSE INTOLERANCE: ICD-10-CM

## 2018-11-06 DIAGNOSIS — E78.2 MIXED HYPERLIPIDEMIA: ICD-10-CM

## 2018-11-06 DIAGNOSIS — E03.4 HYPOTHYROIDISM DUE TO ACQUIRED ATROPHY OF THYROID: ICD-10-CM

## 2018-11-06 DIAGNOSIS — D50.9 IRON DEFICIENCY ANEMIA, UNSPECIFIED IRON DEFICIENCY ANEMIA TYPE: Primary | ICD-10-CM

## 2018-11-08 RX ORDER — HYDROCODONE BITARTRATE AND ACETAMINOPHEN 5; 325 MG/1; MG/1
1 TABLET ORAL 2 TIMES DAILY PRN
Qty: 60 TABLET | Refills: 0 | Status: SHIPPED | OUTPATIENT
Start: 2018-11-08 | End: 2019-01-03 | Stop reason: SDUPTHER

## 2018-11-29 RX ORDER — SUMATRIPTAN 100 MG/1
100 TABLET, FILM COATED ORAL
Qty: 4 TABLET | Refills: 2 | Status: SHIPPED | OUTPATIENT
Start: 2018-11-29 | End: 2019-01-24 | Stop reason: SDUPTHER

## 2018-12-17 DIAGNOSIS — F41.9 ANXIETY: Primary | ICD-10-CM

## 2018-12-17 RX ORDER — LORAZEPAM 0.5 MG/1
TABLET ORAL
Qty: 30 TABLET | Refills: 0 | Status: SHIPPED | OUTPATIENT
Start: 2018-12-17 | End: 2019-01-24 | Stop reason: SDUPTHER

## 2019-01-03 DIAGNOSIS — M48.02 CERVICAL SPINAL STENOSIS: ICD-10-CM

## 2019-01-03 RX ORDER — HYDROCODONE BITARTRATE AND ACETAMINOPHEN 5; 325 MG/1; MG/1
1 TABLET ORAL 2 TIMES DAILY PRN
Qty: 60 TABLET | Refills: 0 | Status: SHIPPED | OUTPATIENT
Start: 2019-01-03 | End: 2019-02-22 | Stop reason: SDUPTHER

## 2019-01-22 ENCOUNTER — HOSPITAL ENCOUNTER (OUTPATIENT)
Age: 59
Discharge: HOME | End: 2019-01-22
Payer: COMMERCIAL

## 2019-01-22 LAB
ALBUMIN SERPL-MCNC: 4.6 GM/DL (ref 3.4–5)
ALP BLD-CCNC: 79 IU/L (ref 40–129)
ALT SERPL-CCNC: 17 U/L (ref 10–40)
ANION GAP SERPL CALCULATED.3IONS-SCNC: 12 MMOL/L (ref 4–16)
AST SERPL-CCNC: 24 IU/L (ref 15–37)
BASOPHILS ABSOLUTE: 0.1 K/CU MM
BASOPHILS RELATIVE PERCENT: 0.6 % (ref 0–1)
BILIRUB SERPL-MCNC: 0.3 MG/DL (ref 0–1)
BILIRUBIN DIRECT: 0.2 MG/DL (ref 0–0.3)
BILIRUBIN, INDIRECT: 0.1 MG/DL (ref 0–0.7)
BUN BLDV-MCNC: 20 MG/DL (ref 6–23)
CALCIUM SERPL-MCNC: 9.4 MG/DL (ref 8.3–10.6)
CHLORIDE BLD-SCNC: 97 MMOL/L (ref 99–110)
CHOLESTEROL: 185 MG/DL
CO2: 28 MMOL/L (ref 21–32)
CREAT SERPL-MCNC: 0.8 MG/DL (ref 0.6–1.1)
DIFFERENTIAL TYPE: ABNORMAL
EOSINOPHILS ABSOLUTE: 0.2 K/CU MM
EOSINOPHILS RELATIVE PERCENT: 1.6 % (ref 0–3)
ESTIMATED AVERAGE GLUCOSE: 103 MG/DL
FERRITIN: 30 NG/ML (ref 15–150)
GFR AFRICAN AMERICAN: >60 ML/MIN/1.73M2
GFR NON-AFRICAN AMERICAN: >60 ML/MIN/1.73M2
GLUCOSE BLD-MCNC: 90 MG/DL (ref 70–99)
HBA1C MFR BLD: 5.2 % (ref 4.2–6.3)
HCT VFR BLD CALC: 47.2 % (ref 37–47)
HDLC SERPL-MCNC: 65 MG/DL
HEMOGLOBIN: 15.3 GM/DL (ref 12.5–16)
IMMATURE NEUTROPHIL %: 0.2 % (ref 0–0.43)
IRON: 67 UG/DL (ref 37–145)
LDL CHOLESTEROL DIRECT: 115 MG/DL
LYMPHOCYTES ABSOLUTE: 1.6 K/CU MM
LYMPHOCYTES RELATIVE PERCENT: 17 % (ref 24–44)
MCH RBC QN AUTO: 30.2 PG (ref 27–31)
MCHC RBC AUTO-ENTMCNC: 32.4 % (ref 32–36)
MCV RBC AUTO: 93.1 FL (ref 78–100)
MONOCYTES ABSOLUTE: 0.7 K/CU MM
MONOCYTES RELATIVE PERCENT: 7 % (ref 0–4)
NUCLEATED RBC %: 0 %
PDW BLD-RTO: 12.5 % (ref 11.7–14.9)
PLATELET # BLD: 367 K/CU MM (ref 140–440)
PMV BLD AUTO: 9.2 FL (ref 7.5–11.1)
POTASSIUM SERPL-SCNC: 4.1 MMOL/L (ref 3.5–5.1)
RBC # BLD: 5.07 M/CU MM (ref 4.2–5.4)
SEGMENTED NEUTROPHILS ABSOLUTE COUNT: 7 K/CU MM
SEGMENTED NEUTROPHILS RELATIVE PERCENT: 73.6 % (ref 36–66)
SODIUM BLD-SCNC: 137 MMOL/L (ref 135–145)
TOTAL IMMATURE NEUTOROPHIL: 0.02 K/CU MM
TOTAL NUCLEATED RBC: 0 K/CU MM
TOTAL PROTEIN: 7.1 GM/DL (ref 6.4–8.2)
TRIGL SERPL-MCNC: 132 MG/DL
TSH HIGH SENSITIVITY: 1.99 UIU/ML (ref 0.27–4.2)
WBC # BLD: 9.5 K/CU MM (ref 4–10.5)

## 2019-01-22 PROCEDURE — 82728 ASSAY OF FERRITIN: CPT

## 2019-01-22 PROCEDURE — 83721 ASSAY OF BLOOD LIPOPROTEIN: CPT

## 2019-01-22 PROCEDURE — 83036 HEMOGLOBIN GLYCOSYLATED A1C: CPT

## 2019-01-22 PROCEDURE — 80061 LIPID PANEL: CPT

## 2019-01-22 PROCEDURE — 80053 COMPREHEN METABOLIC PANEL: CPT

## 2019-01-22 PROCEDURE — 85025 COMPLETE CBC W/AUTO DIFF WBC: CPT

## 2019-01-22 PROCEDURE — 84443 ASSAY THYROID STIM HORMONE: CPT

## 2019-01-22 PROCEDURE — 82248 BILIRUBIN DIRECT: CPT

## 2019-01-22 PROCEDURE — 83540 ASSAY OF IRON: CPT

## 2019-01-24 ENCOUNTER — OFFICE VISIT (OUTPATIENT)
Dept: INTERNAL MEDICINE CLINIC | Age: 59
End: 2019-01-24
Payer: COMMERCIAL

## 2019-01-24 VITALS
DIASTOLIC BLOOD PRESSURE: 90 MMHG | BODY MASS INDEX: 26.75 KG/M2 | HEIGHT: 63 IN | RESPIRATION RATE: 15 BRPM | SYSTOLIC BLOOD PRESSURE: 135 MMHG | WEIGHT: 151 LBS | HEART RATE: 88 BPM

## 2019-01-24 DIAGNOSIS — M48.02 CERVICAL SPINAL STENOSIS: ICD-10-CM

## 2019-01-24 DIAGNOSIS — E03.4 HYPOTHYROIDISM DUE TO ACQUIRED ATROPHY OF THYROID: Primary | ICD-10-CM

## 2019-01-24 DIAGNOSIS — K21.9 GASTROESOPHAGEAL REFLUX DISEASE WITHOUT ESOPHAGITIS: ICD-10-CM

## 2019-01-24 DIAGNOSIS — E78.2 MIXED HYPERLIPIDEMIA: ICD-10-CM

## 2019-01-24 DIAGNOSIS — F41.9 ANXIETY: ICD-10-CM

## 2019-01-24 PROCEDURE — 99213 OFFICE O/P EST LOW 20 MIN: CPT | Performed by: INTERNAL MEDICINE

## 2019-01-24 RX ORDER — SUMATRIPTAN 100 MG/1
100 TABLET, FILM COATED ORAL
Qty: 9 TABLET | Refills: 2 | Status: SHIPPED | OUTPATIENT
Start: 2019-01-24 | End: 2019-03-26 | Stop reason: SDUPTHER

## 2019-01-24 RX ORDER — LORAZEPAM 0.5 MG/1
TABLET ORAL
Qty: 30 TABLET | Refills: 2 | Status: SHIPPED | OUTPATIENT
Start: 2019-01-24 | End: 2019-07-30 | Stop reason: SDUPTHER

## 2019-02-15 RX ORDER — PHENAZOPYRIDINE HYDROCHLORIDE 100 MG/1
100 TABLET, FILM COATED ORAL 3 TIMES DAILY PRN
Qty: 30 TABLET | Refills: 0 | Status: SHIPPED | OUTPATIENT
Start: 2019-02-15 | End: 2019-07-30 | Stop reason: SDUPTHER

## 2019-02-22 DIAGNOSIS — M48.02 CERVICAL SPINAL STENOSIS: ICD-10-CM

## 2019-02-22 RX ORDER — AZITHROMYCIN 250 MG/1
TABLET, FILM COATED ORAL
Qty: 1 PACKET | Refills: 0 | Status: SHIPPED | OUTPATIENT
Start: 2019-02-22 | End: 2019-03-04

## 2019-02-22 RX ORDER — HYDROCODONE BITARTRATE AND ACETAMINOPHEN 5; 325 MG/1; MG/1
1 TABLET ORAL 2 TIMES DAILY PRN
Qty: 60 TABLET | Refills: 0 | Status: SHIPPED | OUTPATIENT
Start: 2019-02-22 | End: 2019-04-09 | Stop reason: SDUPTHER

## 2019-03-26 RX ORDER — SUMATRIPTAN 100 MG/1
100 TABLET, FILM COATED ORAL
Qty: 9 TABLET | Refills: 2 | Status: SHIPPED | OUTPATIENT
Start: 2019-03-26 | End: 2019-05-14 | Stop reason: SDUPTHER

## 2019-04-09 DIAGNOSIS — M48.02 CERVICAL SPINAL STENOSIS: ICD-10-CM

## 2019-04-09 RX ORDER — HYDROCODONE BITARTRATE AND ACETAMINOPHEN 5; 325 MG/1; MG/1
1 TABLET ORAL 2 TIMES DAILY PRN
Qty: 60 TABLET | Refills: 0 | Status: SHIPPED | OUTPATIENT
Start: 2019-04-09 | End: 2019-06-07 | Stop reason: SDUPTHER

## 2019-04-09 NOTE — TELEPHONE ENCOUNTER
Controlled Substances Monitoring:     RX Monitoring 4/9/2019   Attestation The Prescription Monitoring Report for this patient was reviewed today.    Chronic Pain Routine Monitoring No signs of potential drug abuse or diversion identified: otherwise, see note documentation

## 2019-05-14 ENCOUNTER — TELEPHONE (OUTPATIENT)
Dept: INTERNAL MEDICINE CLINIC | Age: 59
End: 2019-05-14

## 2019-05-14 RX ORDER — SUMATRIPTAN 100 MG/1
100 TABLET, FILM COATED ORAL
Qty: 9 TABLET | Refills: 2 | Status: SHIPPED | OUTPATIENT
Start: 2019-05-14 | End: 2019-09-23 | Stop reason: SDUPTHER

## 2019-06-07 DIAGNOSIS — M48.02 CERVICAL SPINAL STENOSIS: ICD-10-CM

## 2019-06-07 RX ORDER — HYDROCODONE BITARTRATE AND ACETAMINOPHEN 5; 325 MG/1; MG/1
1 TABLET ORAL 2 TIMES DAILY PRN
Qty: 60 TABLET | Refills: 0 | Status: SHIPPED | OUTPATIENT
Start: 2019-06-07 | End: 2019-07-26 | Stop reason: SDUPTHER

## 2019-06-07 NOTE — TELEPHONE ENCOUNTER
Controlled Substance Monitoring:    Acute and Chronic Pain Monitoring:   RX Monitoring 6/7/2019   Attestation -   Periodic Controlled Substance Monitoring No signs of potential drug abuse or diversion identified.

## 2019-07-26 DIAGNOSIS — M48.02 CERVICAL SPINAL STENOSIS: ICD-10-CM

## 2019-07-26 RX ORDER — HYDROCODONE BITARTRATE AND ACETAMINOPHEN 5; 325 MG/1; MG/1
1 TABLET ORAL 2 TIMES DAILY PRN
Qty: 60 TABLET | Refills: 0 | Status: SHIPPED | OUTPATIENT
Start: 2019-07-26 | End: 2019-09-16 | Stop reason: SDUPTHER

## 2019-07-26 NOTE — TELEPHONE ENCOUNTER
Controlled Substance Monitoring:    Acute and Chronic Pain Monitoring:   RX Monitoring 7/26/2019   Attestation -   Periodic Controlled Substance Monitoring No signs of potential drug abuse or diversion identified.

## 2019-07-30 DIAGNOSIS — F41.9 ANXIETY: ICD-10-CM

## 2019-07-30 RX ORDER — PHENAZOPYRIDINE HYDROCHLORIDE 100 MG/1
100 TABLET, FILM COATED ORAL 3 TIMES DAILY PRN
Qty: 30 TABLET | Refills: 0 | Status: SHIPPED | OUTPATIENT
Start: 2019-07-30 | End: 2019-12-02 | Stop reason: SDUPTHER

## 2019-07-30 RX ORDER — LORAZEPAM 0.5 MG/1
TABLET ORAL
Qty: 30 TABLET | Refills: 2 | Status: SHIPPED | OUTPATIENT
Start: 2019-07-30 | End: 2019-10-28

## 2019-07-30 NOTE — TELEPHONE ENCOUNTER
Controlled Substance Monitoring:    Acute and Chronic Pain Monitoring:   RX Monitoring 7/30/2019   Attestation -   Periodic Controlled Substance Monitoring Potential drug abuse or diversion identified, see note documentation.

## 2019-08-09 ENCOUNTER — TELEPHONE (OUTPATIENT)
Dept: INTERNAL MEDICINE CLINIC | Age: 59
End: 2019-08-09

## 2019-08-09 RX ORDER — PREDNISONE 10 MG/1
TABLET ORAL
Qty: 20 TABLET | Refills: 0 | Status: SHIPPED | OUTPATIENT
Start: 2019-08-09 | End: 2019-08-19

## 2019-08-09 RX ORDER — HYDROXYZINE HYDROCHLORIDE 10 MG/1
10 TABLET, FILM COATED ORAL 3 TIMES DAILY PRN
Qty: 45 TABLET | Refills: 1 | Status: SHIPPED | OUTPATIENT
Start: 2019-08-09 | End: 2019-08-19

## 2019-08-09 NOTE — TELEPHONE ENCOUNTER
Tapering prednisone dose and Atarax 10 mg tid prn itching were sent to [facility] if not improving

## 2019-08-22 ENCOUNTER — TELEPHONE (OUTPATIENT)
Dept: INTERNAL MEDICINE CLINIC | Age: 59
End: 2019-08-22

## 2019-08-22 RX ORDER — LEVOTHYROXINE SODIUM 0.07 MG/1
TABLET ORAL
Qty: 90 TABLET | Refills: 3 | Status: SHIPPED | OUTPATIENT
Start: 2019-08-22 | End: 2020-02-12

## 2019-08-22 NOTE — TELEPHONE ENCOUNTER
Rachid Tucker called and stated she has had an increase in heart burn and GI problems she wanted to know if you could increase her Prilosec or add something different to it.      [facility]

## 2019-09-16 DIAGNOSIS — M48.02 CERVICAL SPINAL STENOSIS: ICD-10-CM

## 2019-09-16 RX ORDER — HYDROCODONE BITARTRATE AND ACETAMINOPHEN 5; 325 MG/1; MG/1
1 TABLET ORAL 2 TIMES DAILY PRN
Qty: 60 TABLET | Refills: 0 | Status: SHIPPED | OUTPATIENT
Start: 2019-09-16 | End: 2019-11-07 | Stop reason: SDUPTHER

## 2019-09-16 RX ORDER — ZOLPIDEM TARTRATE 5 MG/1
5 TABLET ORAL NIGHTLY PRN
Qty: 30 TABLET | Refills: 3 | Status: CANCELLED | OUTPATIENT
Start: 2019-09-16

## 2019-09-16 NOTE — TELEPHONE ENCOUNTER
Patient unaware she could take Ativan to help her sleep, she will try that. She does need [place] refilled.

## 2019-09-24 RX ORDER — SUMATRIPTAN 100 MG/1
100 TABLET, FILM COATED ORAL
Qty: 9 TABLET | Refills: 2 | Status: SHIPPED | OUTPATIENT
Start: 2019-09-24 | End: 2019-12-27 | Stop reason: SDUPTHER

## 2019-09-27 ENCOUNTER — TELEPHONE (OUTPATIENT)
Dept: INTERNAL MEDICINE CLINIC | Age: 59
End: 2019-09-27

## 2019-09-30 RX ORDER — FLUCONAZOLE 150 MG/1
150 TABLET ORAL ONCE
Qty: 1 TABLET | Refills: 0 | Status: SHIPPED | OUTPATIENT
Start: 2019-09-30 | End: 2019-09-30

## 2019-10-30 ENCOUNTER — TELEPHONE (OUTPATIENT)
Dept: INTERNAL MEDICINE CLINIC | Age: 59
End: 2019-10-30

## 2019-10-30 RX ORDER — AZITHROMYCIN 250 MG/1
250 TABLET, FILM COATED ORAL SEE ADMIN INSTRUCTIONS
Qty: 6 TABLET | Refills: 0 | Status: SHIPPED | OUTPATIENT
Start: 2019-10-30 | End: 2019-11-04

## 2019-11-06 DIAGNOSIS — M48.02 CERVICAL SPINAL STENOSIS: ICD-10-CM

## 2019-11-07 RX ORDER — HYDROCODONE BITARTRATE AND ACETAMINOPHEN 5; 325 MG/1; MG/1
1 TABLET ORAL 2 TIMES DAILY PRN
Qty: 60 TABLET | Refills: 0 | Status: SHIPPED | OUTPATIENT
Start: 2019-11-07 | End: 2020-02-12

## 2019-11-08 DIAGNOSIS — E03.4 HYPOTHYROIDISM DUE TO ACQUIRED ATROPHY OF THYROID: ICD-10-CM

## 2019-11-08 DIAGNOSIS — E74.39 GLUCOSE INTOLERANCE: ICD-10-CM

## 2019-11-08 DIAGNOSIS — E78.2 MIXED HYPERLIPIDEMIA: Primary | ICD-10-CM

## 2019-11-08 DIAGNOSIS — D50.9 IRON DEFICIENCY ANEMIA, UNSPECIFIED IRON DEFICIENCY ANEMIA TYPE: ICD-10-CM

## 2019-11-10 ENCOUNTER — HOSPITAL ENCOUNTER (OUTPATIENT)
Age: 59
Discharge: HOME | End: 2019-11-10
Payer: COMMERCIAL

## 2019-11-10 LAB
ALBUMIN SERPL-MCNC: 4.3 GM/DL (ref 3.4–5)
ALP BLD-CCNC: 88 IU/L (ref 40–129)
ALT SERPL-CCNC: 14 U/L (ref 10–40)
ANION GAP SERPL CALCULATED.3IONS-SCNC: 10 MMOL/L (ref 4–16)
AST SERPL-CCNC: 21 IU/L (ref 15–37)
BASOPHILS ABSOLUTE: 0.1 K/CU MM
BASOPHILS RELATIVE PERCENT: 0.8 % (ref 0–1)
BILIRUB SERPL-MCNC: 0.4 MG/DL (ref 0–1)
BILIRUBIN DIRECT: 0.2 MG/DL (ref 0–0.3)
BILIRUBIN, INDIRECT: 0.2 MG/DL (ref 0–0.7)
BUN BLDV-MCNC: 15 MG/DL (ref 6–23)
CALCIUM SERPL-MCNC: 9.4 MG/DL (ref 8.3–10.6)
CHLORIDE BLD-SCNC: 101 MMOL/L (ref 99–110)
CHOLESTEROL: 214 MG/DL
CO2: 27 MMOL/L (ref 21–32)
CREAT SERPL-MCNC: 0.7 MG/DL (ref 0.6–1.1)
DIFFERENTIAL TYPE: ABNORMAL
EOSINOPHILS ABSOLUTE: 0.1 K/CU MM
EOSINOPHILS RELATIVE PERCENT: 1.9 % (ref 0–3)
ESTIMATED AVERAGE GLUCOSE: 105 MG/DL
GFR AFRICAN AMERICAN: >60 ML/MIN/1.73M2
GFR NON-AFRICAN AMERICAN: >60 ML/MIN/1.73M2
GLUCOSE BLD-MCNC: 87 MG/DL (ref 70–99)
HBA1C MFR BLD: 5.3 % (ref 4.2–6.3)
HCT VFR BLD CALC: 43.3 % (ref 37–47)
HDLC SERPL-MCNC: 57 MG/DL
HEMOGLOBIN: 13.7 GM/DL (ref 12.5–16)
IMMATURE NEUTROPHIL %: 0.5 % (ref 0–0.43)
LDL CHOLESTEROL DIRECT: 150 MG/DL
LYMPHOCYTES ABSOLUTE: 1.7 K/CU MM
LYMPHOCYTES RELATIVE PERCENT: 25.8 % (ref 24–44)
MCH RBC QN AUTO: 30.2 PG (ref 27–31)
MCHC RBC AUTO-ENTMCNC: 31.6 % (ref 32–36)
MCV RBC AUTO: 95.4 FL (ref 78–100)
MONOCYTES ABSOLUTE: 0.6 K/CU MM
MONOCYTES RELATIVE PERCENT: 9.4 % (ref 0–4)
NUCLEATED RBC %: 0 %
PDW BLD-RTO: 12.6 % (ref 11.7–14.9)
PLATELET # BLD: 349 K/CU MM (ref 140–440)
PMV BLD AUTO: 8.7 FL (ref 7.5–11.1)
POTASSIUM SERPL-SCNC: 4.4 MMOL/L (ref 3.5–5.1)
RBC # BLD: 4.54 M/CU MM (ref 4.2–5.4)
SEGMENTED NEUTROPHILS ABSOLUTE COUNT: 4 K/CU MM
SEGMENTED NEUTROPHILS RELATIVE PERCENT: 61.6 % (ref 36–66)
SODIUM BLD-SCNC: 138 MMOL/L (ref 135–145)
TOTAL CK: 68 IU/L (ref 26–140)
TOTAL IMMATURE NEUTOROPHIL: 0.03 K/CU MM
TOTAL NUCLEATED RBC: 0 K/CU MM
TOTAL PROTEIN: 6.8 GM/DL (ref 6.4–8.2)
TRIGL SERPL-MCNC: 111 MG/DL
TSH HIGH SENSITIVITY: 2.89 UIU/ML (ref 0.27–4.2)
WBC # BLD: 6.5 K/CU MM (ref 4–10.5)

## 2019-11-10 PROCEDURE — 82248 BILIRUBIN DIRECT: CPT

## 2019-11-10 PROCEDURE — 80061 LIPID PANEL: CPT

## 2019-11-10 PROCEDURE — 80053 COMPREHEN METABOLIC PANEL: CPT

## 2019-11-10 PROCEDURE — 84443 ASSAY THYROID STIM HORMONE: CPT

## 2019-11-10 PROCEDURE — 82550 ASSAY OF CK (CPK): CPT

## 2019-11-10 PROCEDURE — 36415 COLL VENOUS BLD VENIPUNCTURE: CPT

## 2019-11-10 PROCEDURE — 83036 HEMOGLOBIN GLYCOSYLATED A1C: CPT

## 2019-11-10 PROCEDURE — 85025 COMPLETE CBC W/AUTO DIFF WBC: CPT

## 2019-11-10 PROCEDURE — 83721 ASSAY OF BLOOD LIPOPROTEIN: CPT

## 2019-11-14 ENCOUNTER — OFFICE VISIT (OUTPATIENT)
Dept: INTERNAL MEDICINE CLINIC | Age: 59
End: 2019-11-14
Payer: COMMERCIAL

## 2019-11-14 VITALS
DIASTOLIC BLOOD PRESSURE: 80 MMHG | BODY MASS INDEX: 27.39 KG/M2 | HEART RATE: 90 BPM | SYSTOLIC BLOOD PRESSURE: 130 MMHG | WEIGHT: 152.2 LBS

## 2019-11-14 DIAGNOSIS — E78.2 MIXED HYPERLIPIDEMIA: ICD-10-CM

## 2019-11-14 DIAGNOSIS — K21.9 GASTROESOPHAGEAL REFLUX DISEASE WITHOUT ESOPHAGITIS: Primary | ICD-10-CM

## 2019-11-14 DIAGNOSIS — F41.9 ANXIETY: ICD-10-CM

## 2019-11-14 DIAGNOSIS — E03.4 HYPOTHYROIDISM DUE TO ACQUIRED ATROPHY OF THYROID: ICD-10-CM

## 2019-11-14 DIAGNOSIS — M79.672 LEFT FOOT PAIN: ICD-10-CM

## 2019-11-14 PROCEDURE — 99213 OFFICE O/P EST LOW 20 MIN: CPT | Performed by: INTERNAL MEDICINE

## 2019-11-14 ASSESSMENT — PATIENT HEALTH QUESTIONNAIRE - PHQ9
2. FEELING DOWN, DEPRESSED OR HOPELESS: 0
SUM OF ALL RESPONSES TO PHQ QUESTIONS 1-9: 0
1. LITTLE INTEREST OR PLEASURE IN DOING THINGS: 0
SUM OF ALL RESPONSES TO PHQ QUESTIONS 1-9: 0
SUM OF ALL RESPONSES TO PHQ9 QUESTIONS 1 & 2: 0

## 2019-12-05 RX ORDER — PHENAZOPYRIDINE HYDROCHLORIDE 100 MG/1
100 TABLET, FILM COATED ORAL 3 TIMES DAILY PRN
Qty: 30 TABLET | Refills: 0 | Status: SHIPPED | OUTPATIENT
Start: 2019-12-05 | End: 2020-05-17 | Stop reason: SDUPTHER

## 2019-12-27 RX ORDER — SUMATRIPTAN 100 MG/1
100 TABLET, FILM COATED ORAL
Qty: 9 TABLET | Refills: 5 | Status: SHIPPED | OUTPATIENT
Start: 2019-12-27 | End: 2020-05-17 | Stop reason: SDUPTHER

## 2020-01-09 RX ORDER — HYDROCODONE BITARTRATE AND ACETAMINOPHEN 5; 325 MG/1; MG/1
1 TABLET ORAL 2 TIMES DAILY PRN
Qty: 60 TABLET | Refills: 0 | OUTPATIENT
Start: 2020-01-09 | End: 2020-02-08

## 2020-02-02 RX ORDER — OMEPRAZOLE 40 MG/1
CAPSULE, DELAYED RELEASE ORAL
Qty: 90 CAPSULE | Refills: 1 | Status: SHIPPED | OUTPATIENT
Start: 2020-02-02 | End: 2020-04-08 | Stop reason: SDUPTHER

## 2020-02-12 ENCOUNTER — OFFICE VISIT (OUTPATIENT)
Dept: INTERNAL MEDICINE CLINIC | Age: 60
End: 2020-02-12
Payer: COMMERCIAL

## 2020-02-12 VITALS
HEIGHT: 63 IN | OXYGEN SATURATION: 98 % | HEART RATE: 101 BPM | RESPIRATION RATE: 16 BRPM | DIASTOLIC BLOOD PRESSURE: 88 MMHG | SYSTOLIC BLOOD PRESSURE: 124 MMHG | WEIGHT: 157 LBS | BODY MASS INDEX: 27.82 KG/M2

## 2020-02-12 PROBLEM — M54.6 CHRONIC RIGHT-SIDED THORACIC BACK PAIN: Status: ACTIVE | Noted: 2017-09-12

## 2020-02-12 PROBLEM — L40.9 PSORIASIS OF SCALP: Status: ACTIVE | Noted: 2020-02-12

## 2020-02-12 PROBLEM — D64.9 NORMOCYTIC ANEMIA: Status: ACTIVE | Noted: 2020-02-12

## 2020-02-12 PROBLEM — K21.9 GASTROESOPHAGEAL REFLUX DISEASE WITHOUT ESOPHAGITIS: Status: ACTIVE | Noted: 2020-02-12

## 2020-02-12 PROBLEM — G89.29 CHRONIC RIGHT-SIDED LOW BACK PAIN WITHOUT SCIATICA: Status: ACTIVE | Noted: 2020-02-12

## 2020-02-12 PROBLEM — M54.50 CHRONIC RIGHT-SIDED LOW BACK PAIN WITHOUT SCIATICA: Status: ACTIVE | Noted: 2020-02-12

## 2020-02-12 PROCEDURE — 96372 THER/PROPH/DIAG INJ SC/IM: CPT | Performed by: FAMILY MEDICINE

## 2020-02-12 PROCEDURE — 99204 OFFICE O/P NEW MOD 45 MIN: CPT | Performed by: FAMILY MEDICINE

## 2020-02-12 RX ORDER — LANOLIN ALCOHOL/MO/W.PET/CERES
1000 CREAM (GRAM) TOPICAL DAILY
Qty: 30 TABLET | Refills: 3 | Status: SHIPPED | OUTPATIENT
Start: 2020-02-12 | End: 2020-07-05 | Stop reason: SDUPTHER

## 2020-02-12 RX ORDER — PHENOL 1.4 %
1 AEROSOL, SPRAY (ML) MUCOUS MEMBRANE DAILY
Qty: 30 TABLET | Refills: 3 | Status: SHIPPED | OUTPATIENT
Start: 2020-02-12 | End: 2020-11-25 | Stop reason: ALTCHOICE

## 2020-02-12 RX ORDER — HYDROCODONE BITARTRATE AND ACETAMINOPHEN 5; 325 MG/1; MG/1
1 TABLET ORAL 2 TIMES DAILY PRN
Qty: 60 TABLET | Refills: 0 | Status: SHIPPED | OUTPATIENT
Start: 2020-02-12 | End: 2020-05-26 | Stop reason: SDUPTHER

## 2020-02-12 RX ORDER — CYANOCOBALAMIN 1000 UG/ML
1000 INJECTION INTRAMUSCULAR; SUBCUTANEOUS ONCE
Status: COMPLETED | OUTPATIENT
Start: 2020-02-12 | End: 2020-02-12

## 2020-02-12 RX ORDER — CLOBETASOL PROPIONATE 0.5 MG/G
OINTMENT TOPICAL
Qty: 1 TUBE | Refills: 3 | Status: SHIPPED | OUTPATIENT
Start: 2020-02-12

## 2020-02-12 RX ADMIN — CYANOCOBALAMIN 1000 MCG: 1000 INJECTION INTRAMUSCULAR; SUBCUTANEOUS at 17:24

## 2020-02-12 ASSESSMENT — ENCOUNTER SYMPTOMS
CHEST TIGHTNESS: 0
CONSTIPATION: 0
EYE ITCHING: 0
BACK PAIN: 1
TROUBLE SWALLOWING: 0
ABDOMINAL DISTENTION: 0
COUGH: 0
SHORTNESS OF BREATH: 0
EYE REDNESS: 0
SORE THROAT: 0
SINUS PAIN: 0
DIARRHEA: 0
ABDOMINAL PAIN: 0
WHEEZING: 0
VOMITING: 0
NAUSEA: 0

## 2020-02-12 NOTE — PROGRESS NOTES
osteoporosis. Patient currently on Prolia. Patient Active Problem List   Diagnosis    Hypothyroidism-Hashimoto's    Osteoporosis    Melanoma-left forearm    Migraine headache    IBS (irritable bowel syndrome)    Controlled substance agreement signed    Generalized anxiety disorder    Chronic right-sided thoracic back pain    Psoriasis of scalp    Normocytic anemia       Past Medical History:   Diagnosis Date    Dyspepsia     Eczema     Headache, migraine     Hypothyroid     with positve antibodies    IBS (irritable bowel syndrome)     Melanoma in situ of upper extremity (HCC)     left arm    Osteoporosis     Panic disorder         Social History     Tobacco Use    Smoking status: Never Smoker    Smokeless tobacco: Never Used   Substance Use Topics    Alcohol use: No        Review of Systems   Constitutional: Negative for appetite change, chills, fatigue, fever and unexpected weight change. HENT: Negative for congestion, ear pain, sinus pain, sore throat and trouble swallowing. Eyes: Negative for redness and itching. Respiratory: Negative for cough, chest tightness, shortness of breath and wheezing. Cardiovascular: Negative for chest pain and palpitations. Gastrointestinal: Negative for abdominal distention, abdominal pain, constipation, diarrhea, nausea and vomiting. Endocrine: Negative for polyuria. Genitourinary: Negative for difficulty urinating, dysuria, frequency and urgency. Musculoskeletal: Positive for back pain and myalgias. Negative for arthralgias. Skin: Positive for rash. Neurological: Negative for dizziness and headaches. Psychiatric/Behavioral: Positive for sleep disturbance. Negative for behavioral problems, confusion and suicidal ideas. The patient is nervous/anxious.             Objective:      /88 (Site: Left Upper Arm, Position: Sitting, Cuff Size: Medium Adult)   Pulse 101   Resp 16   Ht 5' 3\" (1.6 m)   Wt 157 lb (71.2 kg)   SpO2 98% further evaluation. Patient will receive 1 month of supply every month and will visit office every 3 months. - XR THORACIC SPINE (3 VIEWS); Future  - HYDROcodone-acetaminophen (NORCO) 5-325 MG per tablet; Take 1 tablet by mouth 2 times daily as needed for Pain for up to 30 days. Dispense: 60 tablet; Refill: 0    2. Generalized anxiety disorder  - Stable without any medication. 3. Periodic headache syndrome, not intractable  - Continue as needed sumatriptan and OTC Tylenol    4. Psoriasis of scalp  - The patient on clobetasol cream as needed. - clobetasol (TEMOVATE) 0.05 % ointment; Apply topically 2 times daily. Dispense: 1 Tube; Refill: 3    5. Normocytic anemia  - Patient was anemic with high MCV count. Started her on oral vitamin B 12 supplement and B12 shot at clinic. - cyanocobalamin injection 1,000 mcg  - vitamin B-12 (CYANOCOBALAMIN) 1000 MCG tablet; Take 1 tablet by mouth daily  Dispense: 30 tablet; Refill: 3    6. Hypothyroidism due to acquired atrophy of thyroid  - Continue current home medication levothyroxine 75 mcg. 7. Age-related osteoporosis without current pathological fracture  -Started on patient calcium carbonate tablet. - DEXA BONE DENSITY AXIAL SKELETON  - calcium carbonate (CALCIUM 600) 600 MG TABS tablet; Take 1 tablet by mouth daily  Dispense: 30 tablet; Refill: 3    8. Gastroesophageal reflux disease without esophagitis  - Recommend patient to discontinue medication, omeprazole or take as needed NSAID there are symptoms. PPI holiday. - Discussed to reduce 3 yogurt diet and long bone broth. - PPI can raise the risk of osteoporosis and B12 deficiency. 9. Encounter to establish care with new doctor  - Stable and fair health. Note:   Patient understand the assessment and agreed to the plan. Medication side effects was discussed during the encounter. Before discharging the patient, all questions and concern were addressed. After visit summary was provided.

## 2020-02-12 NOTE — LETTER
reduced coughing, which are especially dangerous for patients with lung disease. Overdose or dangerous interactions with alcohol and other medications may occur, leading to death. Hyperalgesia may develop, in which patients receiving opioids for the treatment of pain may actually become more sensitive to certain painful stimuli, and in some cases, experience pain from ordinarily non-painful stimuli. Women between the ages of 14-53 who could become pregnant should carefully weigh the risks and benefits of opioids with their physicians, as these medications increase the risk of pregnancy complications, including miscarriage,  delivery and stillbirth. It is also possible for babies to be born addicted to opioids. Opioid dependence withdrawal symptoms may include; feelings of uneasiness, increased pain, irritability, belly pain, diarrhea, sweats and goose-flesh. Benzodiazepines and non-benzodiazepine sleep medications: These medications can lead to problems such as addiction/dependence, sedation, fatigue, lightheadedness, dizziness, incoordination, falls, depression, hallucinations, and impaired judgment, memory and concentration. The ability to drive and operate machinery may also be affected. Abnormal sleep-related behaviors have been reported, including sleep walking, driving, making telephone calls, eating, or having sex while not fully awake. These medications can suppress breathing and worsen sleep apnea, particularly when combined with alcohol or other sedating medications, potentially leading to death. Dependence withdrawal symptoms may include tremors, anxiety, hallucinations and seizures. Stimulants:  Common adverse effects include addiction/dependence, increased blood pressure and heart rate, decreased appetite, nausea, involuntary weight loss, insomnia, irritability, and headaches.   These risks may increase when these medications are combined with other stimulants, such as caffeine pills or energy drinks, certain weight loss supplements and oral decongestants. Dependence withdrawal symptoms may include depressed mood, loss of interest, suicidal thoughts, anxiety, fatigue, appetite changes and agitation. Testosterone replacement therapy:  Potential side effects include increased risk of stroke and heart attack, blood clots, increased blood pressure, increased cholesterol, enlarged prostate, sleep apnea, irritability/aggression and other mood disorders, and decreased fertility. Other:     1. I understand that I have the following responsibilities:  · I will take medications at the dose and frequency prescribed. · I will not increase or change how I take my medications without the approval of the health care provider who signs this Medication Agreement. · I will arrange for refills at the prescribed interval ONLY during regular office hours. I will not ask for refills earlier than agreed, after-hours, on holidays or on weekends. · I will obtain all refills for these medications at  ·  ____________________________________  pharmacy (phone number  ·  ________________________), with full consent for my provider and pharmacist to exchange information in writing or verbally. · I will not request any pain medications or controlled substances from other providers and will inform this provider of all other medications I am taking. · I will inform my other health care providers that I am taking these medications and of the existence of this [facility]. In the event of an emergency, I will provide the same information to the emergency department providers. · I will protect my prescriptions and medications. I understand that lost or misplaced prescriptions will not be replaced. · I will keep medications only for my own use and will not share them with others. I will keep all medications away from children. · I agree to participate in any medical, psychological or psychiatric assessments recommended by my provider. · I will actively participate in any program designed to improve function, including social, physical, psychological and daily or work activities. 2. I will not use illegal or street drugs or another person's prescription. If I have an addiction problem with drugs or alcohol and my provider asks me to enter a program to address this issue, I agree to follow through. Such programs may include:  · 12-Step program and securing a sponsor  · Individual counseling   · Inpatient or outpatient treatment  · Other:_____________________________________________________________________________________________________________________________________________    If in treatment, I will request that a copy of the programs initial evaluation and treatment recommendations be sent to this provider and will not expect refills until that is received. I will also request written monthly updates be sent to this provider to verify my continuing treatment. 3. I will consent to drug screening upon my providers request to assure I am only taking the prescribed drugs, described in this MEDICATION AGREEMENT. I understand that a drug screen is a laboratory test in which a sample of my urine, blood or saliva is checked to see what drugs I have been taking. 4. I agree that I will treat the providers and staff at this office with respect at all times. I will keep all of my scheduled appointments, but if I need to cancel my appointment, I will do so a minimum of 24 hours before it is scheduled. 5. I understand that this provider may stop prescribing the medications listed if:  · I do not show any improvement in pain, or my activity has not improved. · I develop rapid tolerance or loss of improvement, as described in my treatment plan. · I develop significant side effects from the medication. · My behavior is inconsistent with the responsibilities outlined above, which may also result in my being prevented from receiving further care from this office. · Other:____________________________________________________________________    AGREEMENT:    I have read the above and have had all of my questions answered. For chronic disease management, I know that my symptoms can be managed with many types of treatments. A chronic medication trial may be part of my treatment, but I must be an active participant in my care. Medication therapy is only one part of my symptom management plan. In some cases, there may be limited scientific evidence to support the chronic use of certain medications to improve symptoms and daily function. Furthermore, in certain circumstances, there may be scientific information that suggests that use of chronic controlled substances may actually worsen my symptoms and increase my risk of unintentional death directly related to this medication therapy. I know that if my provider feels my risk from controlled medications is greater than my benefit, I will have my controlled substance medication(s) compassionately lowered or removed altogether. I agree to a controlled substance medication trial.      I further agree to allow this office to contact my HIPAA contact on file if there are concerns about my safety and use of controlled medications. I have agreed to use the following medications above as instructed by my physician and as stated in this [facility] [address].      Patient Signature:  ______________________  Date:2/12/2020 or _____________    Provider Signature:______________________  Date:2/12/2020 or _____________

## 2020-04-08 RX ORDER — OMEPRAZOLE 40 MG/1
CAPSULE, DELAYED RELEASE ORAL
Qty: 90 CAPSULE | Refills: 1 | Status: SHIPPED | OUTPATIENT
Start: 2020-04-08 | End: 2020-10-09 | Stop reason: SDUPTHER

## 2020-04-20 RX ORDER — LORAZEPAM 0.5 MG/1
TABLET ORAL
Qty: 30 TABLET | Refills: 0 | Status: SHIPPED | OUTPATIENT
Start: 2020-04-20 | End: 2020-05-20

## 2020-05-18 RX ORDER — PHENAZOPYRIDINE HYDROCHLORIDE 100 MG/1
100 TABLET, FILM COATED ORAL 3 TIMES DAILY PRN
Qty: 30 TABLET | Refills: 0 | Status: SHIPPED | OUTPATIENT
Start: 2020-05-18 | End: 2020-10-07 | Stop reason: SDUPTHER

## 2020-05-18 RX ORDER — SUMATRIPTAN 100 MG/1
100 TABLET, FILM COATED ORAL
Qty: 9 TABLET | Refills: 5 | Status: SHIPPED | OUTPATIENT
Start: 2020-05-18 | End: 2020-12-14 | Stop reason: SDUPTHER

## 2020-05-26 RX ORDER — HYDROCODONE BITARTRATE AND ACETAMINOPHEN 5; 325 MG/1; MG/1
1 TABLET ORAL 2 TIMES DAILY PRN
Qty: 60 TABLET | Refills: 0 | Status: SHIPPED | OUTPATIENT
Start: 2020-05-26 | End: 2020-07-27 | Stop reason: SDUPTHER

## 2020-07-06 RX ORDER — LEVOTHYROXINE SODIUM 0.07 MG/1
TABLET ORAL
Qty: 90 TABLET | Refills: 3 | Status: SHIPPED | OUTPATIENT
Start: 2020-07-06 | End: 2021-07-09 | Stop reason: SDUPTHER

## 2020-07-06 RX ORDER — ONDANSETRON 4 MG/1
4 TABLET, FILM COATED ORAL EVERY 8 HOURS PRN
Qty: 30 TABLET | Refills: 0 | Status: SHIPPED | OUTPATIENT
Start: 2020-07-06 | End: 2021-10-19 | Stop reason: SDUPTHER

## 2020-07-06 RX ORDER — LANOLIN ALCOHOL/MO/W.PET/CERES
1000 CREAM (GRAM) TOPICAL DAILY
Qty: 30 TABLET | Refills: 3 | Status: SHIPPED | OUTPATIENT
Start: 2020-07-06 | End: 2020-11-25

## 2020-07-27 RX ORDER — HYDROCODONE BITARTRATE AND ACETAMINOPHEN 5; 325 MG/1; MG/1
1 TABLET ORAL 2 TIMES DAILY PRN
Qty: 60 TABLET | Refills: 0 | Status: SHIPPED | OUTPATIENT
Start: 2020-07-27 | End: 2020-08-26

## 2020-09-28 NOTE — TELEPHONE ENCOUNTER
Daughter sumit called and updated on plan of care.    Patients  dropped off [facility] paperwork. I took it back and gave it to savana.

## 2020-10-09 RX ORDER — PHENAZOPYRIDINE HYDROCHLORIDE 100 MG/1
100 TABLET, FILM COATED ORAL 3 TIMES DAILY PRN
Qty: 30 TABLET | Refills: 0 | Status: SHIPPED | OUTPATIENT
Start: 2020-10-09 | End: 2021-01-11 | Stop reason: CLARIF

## 2020-11-03 ENCOUNTER — PATIENT MESSAGE (OUTPATIENT)
Dept: INTERNAL MEDICINE CLINIC | Age: 60
End: 2020-11-03

## 2020-11-04 NOTE — TELEPHONE ENCOUNTER
From: Bennie Britt  To: Miki Huston MD  Sent: 11/3/2020 9:52 PM EST  Subject: Non-Urgent Medical Question    Kash got renewal [facility] papers from [facility] that need to be completed for 6month renewal on my hx migraines & anxiety. Still, waiting for reply back from message left on recorded line 11/1. Am, I able to drop off paper work at office? Normally Leatha LOUIS filled paper work out &  signed it.  Thank you Sherren Portal DeWine

## 2020-11-11 ENCOUNTER — PATIENT MESSAGE (OUTPATIENT)
Dept: INTERNAL MEDICINE CLINIC | Age: 60
End: 2020-11-11

## 2020-11-12 NOTE — TELEPHONE ENCOUNTER
From: Fritzi Alpers Dewine  To: Louann Boucher MD  Sent: 11/11/2020 1:48 PM EST  Subject: Prescription Question    Wondering if a provider could call me in a prescription for a zpack pls. Coughing & blowing out yellowish mucus with sore throat. 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     Thx so much Dawson Teixeira

## 2020-11-25 ENCOUNTER — OFFICE VISIT (OUTPATIENT)
Dept: INTERNAL MEDICINE CLINIC | Age: 60
End: 2020-11-25
Payer: COMMERCIAL

## 2020-11-25 VITALS
BODY MASS INDEX: 26.93 KG/M2 | RESPIRATION RATE: 16 BRPM | DIASTOLIC BLOOD PRESSURE: 78 MMHG | HEIGHT: 63 IN | HEART RATE: 110 BPM | WEIGHT: 152 LBS | OXYGEN SATURATION: 97 % | SYSTOLIC BLOOD PRESSURE: 138 MMHG

## 2020-11-25 PROBLEM — E78.2 MIXED HYPERLIPIDEMIA: Status: ACTIVE | Noted: 2020-11-25

## 2020-11-25 PROCEDURE — 99215 OFFICE O/P EST HI 40 MIN: CPT | Performed by: INTERNAL MEDICINE

## 2020-11-25 RX ORDER — DEXTROAMPHETAMINE SACCHARATE, AMPHETAMINE ASPARTATE, DEXTROAMPHETAMINE SULFATE AND AMPHETAMINE SULFATE 3.75; 3.75; 3.75; 3.75 MG/1; MG/1; MG/1; MG/1
TABLET ORAL
COMMUNITY
Start: 2020-10-28

## 2020-11-25 RX ORDER — LORAZEPAM 0.5 MG/1
TABLET ORAL
COMMUNITY
End: 2021-01-11 | Stop reason: CLARIF

## 2020-11-25 RX ORDER — HYDROCODONE BITARTRATE AND ACETAMINOPHEN 5; 325 MG/1; MG/1
1 TABLET ORAL EVERY 6 HOURS PRN
COMMUNITY
End: 2021-01-11 | Stop reason: CLARIF

## 2020-11-25 RX ORDER — TIZANIDINE 2 MG/1
2 TABLET ORAL 3 TIMES DAILY PRN
Qty: 60 TABLET | Refills: 5 | Status: SHIPPED | OUTPATIENT
Start: 2020-11-25 | End: 2020-12-14 | Stop reason: SDUPTHER

## 2020-11-25 ASSESSMENT — PATIENT HEALTH QUESTIONNAIRE - PHQ9
1. LITTLE INTEREST OR PLEASURE IN DOING THINGS: 0
SUM OF ALL RESPONSES TO PHQ QUESTIONS 1-9: 0
2. FEELING DOWN, DEPRESSED OR HOPELESS: 0
SUM OF ALL RESPONSES TO PHQ QUESTIONS 1-9: 0
SUM OF ALL RESPONSES TO PHQ9 QUESTIONS 1 & 2: 0
SUM OF ALL RESPONSES TO PHQ QUESTIONS 1-9: 0

## 2020-11-25 NOTE — PROGRESS NOTES
Linda Britt  1960 11/25/20    SUBJECTIVE:    GERD - pt with GERD, likely gastritis with use of motrin. This was well treated with omeprazole. Thoracic back pain - pt with right sided back pain present for 15-20 years, which she describes as a tightness. She has had injections with little benefit. She has been back surgeon, was recommended to have surgery but pt has declined. Pain is constant. She uses heat, intermittent norco.    Osteoporosis - diagnosed at age 45, had been on an infusion in the past. She did not tolerate fosamax. Hypothyroidism - pt with hypothyroidism for 15-20 years, currently on synthroid 75mcg daily. Anxiety, ADHD - pt follows with Dr Carole Wadsworth, currently on adderall which has been very beneficial. She avoids crowds. She does see Monica Rudder which is beneficial.     Migraine HA - pt with migraine HAs occurring 1-2 times monthly, treated with imitrex and zofran. Symptoms are a unilateral HA (but can be either side), nausea without vomiting, photophobia, phonophobia, diarrhea. IBS - symptoms include left sided discomfort, diarrhea alternating with constipation. Bentyl provides benefit. H/o melanoma - pt with a lesion on the left arm, was seen by gen surgery, was found to have melanoma. She had excisoin with lymph node resection which apparently was all (-). OBJECTIVE:    /78   Pulse 110   Resp 16   Ht 5' 3\" (1.6 m)   Wt 152 lb (68.9 kg)   SpO2 97%   BMI 26.93 kg/m²     Physical Exam  Constitutional:       Appearance: She is well-developed. Eyes:      General: No scleral icterus. Conjunctiva/sclera: Conjunctivae normal.   Neck:      Musculoskeletal: Neck supple. Thyroid: No thyromegaly. Trachea: No tracheal deviation. Cardiovascular:      Rate and Rhythm: Normal rate and regular rhythm. Heart sounds: No murmur. No friction rub. No gallop. Pulmonary:      Effort: No respiratory distress. Breath sounds:  No wheezing or rales. Abdominal:      General: Bowel sounds are normal. There is no distension. Palpations: Abdomen is soft. There is no hepatomegaly or mass. Tenderness: There is no abdominal tenderness. There is no guarding or rebound. Lymphadenopathy:      Cervical: No cervical adenopathy. Skin:     Nails: There is no clubbing. Neurological:      Mental Status: She is alert and oriented to person, place, and time. Psychiatric:         Behavior: Behavior normal.         Judgment: Judgment normal.         ASSESSMENT:    1. Generalized anxiety disorder    2. Chronic right-sided thoracic back pain    3. Hypothyroidism due to acquired atrophy of thyroid    4. Gastroesophageal reflux disease without esophagitis    5. Mixed hyperlipidemia    6. Encounter for screening mammogram for malignant neoplasm of breast    7. Encounter for preventive care    8. Malignant melanoma of right upper extremity including shoulder ([facility] [address]        PLAN:    Rajesh Juan was seen today for establish care. Diagnoses and all orders for this visit:    Generalized anxiety disorder - following with Dr Roshni Scott; no change     Chronic right-sided thoracic back pain - has had extensive eval, currently on intermittent norco; will try zanaflex PRN  -     tiZANidine (ZANAFLEX) 2 MG tablet; Take 1 tablet by mouth 3 times daily as needed (back pain)    Hypothyroidism due to acquired atrophy of thyroid - check labs  -     CBC Auto Differential; Future  -     TSH without Reflex; Future    Gastroesophageal reflux disease without esophagitis - cont PPI, avoid NSAIDs    Mixed hyperlipidemia - check labs  -     Comprehensive Metabolic Panel; Future  -     Lipid Panel; Future  -     CBC Auto Differential; Future    Encounter for screening mammogram for malignant neoplasm of breast  -     SHANTELL DIGITAL SCREEN W OR WO CAD BILATERAL; Future    Encounter for preventive care  -     HEPATITIS C ANTIBODY;  Future    Malignant melanoma of right upper extremity including shoulder ([facility] [address] - I will refer pt to Dr Samuel Hernández for f/u.    -     Amb External Referral To Dermatology

## 2020-12-01 ENCOUNTER — HOSPITAL ENCOUNTER (OUTPATIENT)
Age: 60
Discharge: HOME | End: 2020-12-01
Payer: COMMERCIAL

## 2020-12-01 LAB
ALBUMIN SERPL-MCNC: 4.5 GM/DL (ref 3.4–5)
ALP BLD-CCNC: 75 IU/L (ref 40–128)
ALT SERPL-CCNC: 13 U/L (ref 10–40)
ANION GAP SERPL CALCULATED.3IONS-SCNC: 11 MMOL/L (ref 4–16)
AST SERPL-CCNC: 19 IU/L (ref 15–37)
BILIRUB SERPL-MCNC: 0.3 MG/DL (ref 0–1)
BUN BLDV-MCNC: 20 MG/DL (ref 6–23)
CALCIUM SERPL-MCNC: 9.7 MG/DL (ref 8.3–10.6)
CHLORIDE BLD-SCNC: 97 MMOL/L (ref 99–110)
CHOLESTEROL: 185 MG/DL
CO2: 27 MMOL/L (ref 21–32)
CREAT SERPL-MCNC: 0.8 MG/DL (ref 0.6–1.1)
GFR AFRICAN AMERICAN: >60 ML/MIN/1.73M2
GFR NON-AFRICAN AMERICAN: >60 ML/MIN/1.73M2
GLUCOSE BLD-MCNC: 67 MG/DL (ref 70–99)
HCT VFR BLD CALC: 45.2 % (ref 37–47)
HDLC SERPL-MCNC: 70 MG/DL
HEMOGLOBIN: 14.5 GM/DL (ref 12.5–16)
HEPATITIS C ANTIBODY: NON REACTIVE
LDL CHOLESTEROL DIRECT: 113 MG/DL
MCH RBC QN AUTO: 30.5 PG (ref 27–31)
MCHC RBC AUTO-ENTMCNC: 32.1 % (ref 32–36)
MCV RBC AUTO: 95 FL (ref 78–100)
PDW BLD-RTO: 12.6 % (ref 11.7–14.9)
PLATELET # BLD: 350 K/CU MM (ref 140–440)
PMV BLD AUTO: 9.1 FL (ref 7.5–11.1)
POTASSIUM SERPL-SCNC: 4.2 MMOL/L (ref 3.5–5.1)
RBC # BLD: 4.76 M/CU MM (ref 4.2–5.4)
SODIUM BLD-SCNC: 135 MMOL/L (ref 135–145)
TOTAL PROTEIN: 7.6 GM/DL (ref 6.4–8.2)
TRIGL SERPL-MCNC: 81 MG/DL
TSH HIGH SENSITIVITY: 1.94 UIU/ML (ref 0.27–4.2)
WBC # BLD: 8.1 K/CU MM (ref 4–10.5)

## 2020-12-01 PROCEDURE — 83721 ASSAY OF BLOOD LIPOPROTEIN: CPT

## 2020-12-01 PROCEDURE — 84443 ASSAY THYROID STIM HORMONE: CPT

## 2020-12-01 PROCEDURE — 85027 COMPLETE CBC AUTOMATED: CPT

## 2020-12-01 PROCEDURE — 80053 COMPREHEN METABOLIC PANEL: CPT

## 2020-12-01 PROCEDURE — 86803 HEPATITIS C AB TEST: CPT

## 2020-12-01 PROCEDURE — 80061 LIPID PANEL: CPT

## 2020-12-14 RX ORDER — TIZANIDINE 2 MG/1
2 TABLET ORAL 3 TIMES DAILY PRN
Qty: 60 TABLET | Refills: 5 | Status: SHIPPED | OUTPATIENT
Start: 2020-12-14 | End: 2021-01-11 | Stop reason: CLARIF

## 2020-12-14 RX ORDER — SUMATRIPTAN 100 MG/1
100 TABLET, FILM COATED ORAL
Qty: 9 TABLET | Refills: 1 | Status: SHIPPED | OUTPATIENT
Start: 2020-12-14 | End: 2021-03-12 | Stop reason: SDUPTHER

## 2021-01-11 ENCOUNTER — VIRTUAL VISIT (OUTPATIENT)
Dept: INTERNAL MEDICINE CLINIC | Age: 61
End: 2021-01-11
Payer: COMMERCIAL

## 2021-01-11 DIAGNOSIS — J01.00 ACUTE NON-RECURRENT MAXILLARY SINUSITIS: Primary | ICD-10-CM

## 2021-01-11 PROCEDURE — 99213 OFFICE O/P EST LOW 20 MIN: CPT | Performed by: NURSE PRACTITIONER

## 2021-01-11 RX ORDER — PREDNISONE 10 MG/1
TABLET ORAL
Qty: 20 TABLET | Refills: 0 | Status: SHIPPED | OUTPATIENT
Start: 2021-01-11 | End: 2021-01-20

## 2021-01-11 RX ORDER — AMOXICILLIN 500 MG/1
500 CAPSULE ORAL 3 TIMES DAILY
Qty: 21 CAPSULE | Refills: 0 | Status: SHIPPED | OUTPATIENT
Start: 2021-01-11 | End: 2021-01-18

## 2021-01-11 ASSESSMENT — PATIENT HEALTH QUESTIONNAIRE - PHQ9
SUM OF ALL RESPONSES TO PHQ9 QUESTIONS 1 & 2: 0
SUM OF ALL RESPONSES TO PHQ QUESTIONS 1-9: 0

## 2021-01-11 NOTE — PROGRESS NOTES
2021    TELEHEALTH EVALUATION -- Audio/Visual (During HPPIL-20 public health emergency)    HPI:    Abhishek Osorios (:  1960) has requested an audio/video evaluation for the following concern(s):    Mrs Ish Wagner is a current patient of Dr Bev Camarillo who presents to the office this afternoon virtually for c/o worsening sinus pain/pressure over the last 1-2 week. States she has been blowing thick yellow/green mucus from his nose intermittently over the last week, but much worse in the last week. Denies any fevers, chills, SOB, loss of taste. She has received both does of her COVID vaccine recently, with last dose being last Thursday. Has been taking Mucinex DM with modest benefit. Review of Systems    Prior to Visit Medications    Medication Sig Taking? Authorizing Provider   amoxicillin (AMOXIL) 500 MG capsule Take 1 capsule by mouth 3 times daily for 7 days Yes PITER Link CNP   predniSONE (DELTASONE) 10 MG tablet Take 4 tablets daily for 2 days, then 3 tablets for 2 days, 2 tablets for 2 days, then 1 tablet for 2 days Yes PITER Link CNP   amphetamine-dextroamphetamine (ADDERALL) 15 MG tablet  Yes Historical Provider, MD   ondansetron (ZOFRAN) 4 MG tablet Take 1 tablet by mouth every 8 hours as needed for Nausea or Vomiting Yes Rigo Ferro DO   levothyroxine (SYNTHROID) 75 MCG tablet TAKE 1 TABLET BY MOUTH EVERY DAY Yes Rigo Ferro DO   clobetasol (TEMOVATE) 0.05 % ointment Apply topically 2 times daily. Yes Rigo Ferro DO   dicyclomine (BENTYL) 10 MG capsule Take 1 capsule by mouth 4 times daily Yes Fanny Nash MD   mometasone (ELOCON) 0.1 % cream Apply topically daily. Yes Fanny Nash MD   Cholecalciferol (VITAMIN D3) 2000 UNITS TABS Take 1 tablet by mouth daily.    Yes Historical Provider, MD   SUMAtriptan (IMITREX) 100 MG tablet Take 1 tablet by mouth once as needed for Migraine  Casper Henderson MD fluticasone (FLONASE) 50 MCG/ACT nasal spray USE 1 SPRAY IN EACH NOSTRIL EVERY DAY  Patient not taking: Reported on 11/25/2020  Rachael Perez MD       Social History     Tobacco Use    Smoking status: Never Smoker    Smokeless tobacco: Never Used   Substance Use Topics    Alcohol use: No    Drug use: No        Allergies   Allergen Reactions    Sulfa Antibiotics    ,   Past Medical History:   Diagnosis Date    Dyspepsia     Eczema     Headache, migraine     Hypothyroid     with positve antibodies    IBS (irritable bowel syndrome)     Melanoma in situ of upper extremity (HCC)     left arm    Osteoporosis     Panic disorder    ,   Past Surgical History:   Procedure Laterality Date    BREAST ENHANCEMENT SURGERY  4-2010    bilateral    CHOLECYSTECTOMY  2001    NOSE SURGERY Right 2/2015    benign mass removal-Dr Hanane Silverman   ,   Social History     Tobacco Use    Smoking status: Never Smoker    Smokeless tobacco: Never Used   Substance Use Topics    Alcohol use: No    Drug use: No       PHYSICAL EXAMINATION:  [ INSTRUCTIONS:  \"[x]\" Indicates a positive item  \"[]\" Indicates a negative item  -- DELETE ALL ITEMS NOT EXAMINED]  Vital Signs: (As obtained by patient/caregiver or practitioner observation)    Blood pressure-  Heart rate-    Respiratory rate-    Temperature-  Pulse oximetry-     Constitutional: [x] Appears well-developed and well-nourished [x] No apparent distress      [] Abnormal-   Mental status  [x] Alert and awake  [x] Oriented to person/place/time []Able to follow commands      Eyes:  EOM    [x]  Normal  [] Abnormal-  Sclera  [x]  Normal  [] Abnormal -         Discharge []  None visible  [] Abnormal -    HENT:   [x] Normocephalic, atraumatic.   [] Abnormal   [x] Mouth/Throat: Mucous membranes are moist.     External Ears [x] Normal  [] Abnormal-     Neck: [x] No visualized mass Pulmonary/Chest: [x] Respiratory effort normal.  [x] No visualized signs of difficulty breathing or respiratory distress        [] Abnormal-      Musculoskeletal:   [x] Normal gait with no signs of ataxia         [x] Normal range of motion of neck        [] Abnormal-       Neurological:        [x] No Facial Asymmetry (Cranial nerve 7 motor function) (limited exam to video visit)          [x] No gaze palsy        [] Abnormal-         Skin:        [x] No significant exanthematous lesions or discoloration noted on facial skin         [] Abnormal-            Psychiatric:       [x] Normal Affect [x] No Hallucinations        [] Abnormal-     Other pertinent observable physical exam findings-     ASSESSMENT/PLAN:  1. Acute non-recurrent maxillary sinusitis- limited exam and recent symptoms most c/w dx; will start amox and pred taper as below and if no improvement in 1 week pt to call our office; we did discuss that even with recent vaccination she can still contract COVID and that we would also need to consider testing at that time. - amoxicillin (AMOXIL) 500 MG capsule; Take 1 capsule by mouth 3 times daily for 7 days  Dispense: 21 capsule; Refill: 0  - predniSONE (DELTASONE) 10 MG tablet; Take 4 tablets daily for 2 days, then 3 tablets for 2 days, 2 tablets for 2 days, then 1 tablet for 2 days  Dispense: 20 tablet; Refill: 0    Course of treatment, including any medications, possible imaging, referrals, and follow ups discussed with patient. All risks and benefits and possible side effects discussed with patient who agrees to plan of care and verbalizes understanding. All labs and imaging reviewed. Return if symptoms worsen or fail to improve. Lorena Hood is a 64 y.o. female being evaluated by a Virtual Visit (video visit) encounter to address concerns as mentioned above. A caregiver was present when appropriate. Due to this being a TeleHealth encounter (During MZJVJ-19 public health emergency), evaluation of the following organ systems was limited: Vitals/Constitutional/EENT/Resp/CV/GI//MS/Neuro/Skin/Heme-Lymph-Imm. Pursuant to the emergency declaration under the [address] [place] [address] and the [employer] Act, this Virtual Visit was conducted with patient's (and/or legal guardian's) consent, to reduce the patient's risk of exposure to COVID-19 and provide necessary medical care. The patient (and/or legal guardian) has also been advised to contact this office for worsening conditions or problems, and seek emergency medical treatment and/or call 911 if deemed necessary. Patient identification was verified at the start of the visit: Yes    Total time spent on this encounter: 15 minutes    Services were provided through a video synchronous discussion virtually to substitute for in-person clinic visit. Patient and provider were located at their individual homes. --PITER Doyle CNP on 1/11/2021 at 1:23 PM    An electronic signature was used to authenticate this note.

## 2021-01-20 ENCOUNTER — PATIENT MESSAGE (OUTPATIENT)
Dept: INTERNAL MEDICINE CLINIC | Age: 61
End: 2021-01-20

## 2021-01-20 RX ORDER — OMEPRAZOLE 40 MG/1
CAPSULE, DELAYED RELEASE ORAL
Qty: 90 CAPSULE | Refills: 1
Start: 2021-01-20 | End: 2021-01-21 | Stop reason: SDUPTHER

## 2021-01-20 RX ORDER — PHENAZOPYRIDINE HYDROCHLORIDE 100 MG/1
100 TABLET, FILM COATED ORAL 3 TIMES DAILY PRN
Qty: 30 TABLET | Refills: 0
Start: 2021-01-20 | End: 2021-01-21 | Stop reason: SDUPTHER

## 2021-01-20 NOTE — TELEPHONE ENCOUNTER
From: Yaakov Britt  To: Ba Miller MD  Sent: 1/20/2021 1:19 PM EST  Subject: Prescription Question    I am in need of refill on Omeprazole 40mg & Phenazopyridine 100mg and when looking on my chart on prescriptions I, was unable to find either of them on current page. Just wanting to make sure you can check to see if they loaded properly with Epic from being of Dr Cristhian Medina. Thx so much , Veverly Cheadle    If questions you can reach me on cell phone [phone]. Im off work ED till the wkend .  Thx again

## 2021-01-21 RX ORDER — PHENAZOPYRIDINE HYDROCHLORIDE 100 MG/1
100 TABLET, FILM COATED ORAL 3 TIMES DAILY PRN
Qty: 30 TABLET | Refills: 0 | Status: SHIPPED | OUTPATIENT
Start: 2021-01-21 | End: 2021-08-20 | Stop reason: SDUPTHER

## 2021-01-21 RX ORDER — OMEPRAZOLE 40 MG/1
CAPSULE, DELAYED RELEASE ORAL
Qty: 90 CAPSULE | Refills: 1 | Status: SHIPPED | OUTPATIENT
Start: 2021-01-21 | End: 2021-07-09 | Stop reason: SDUPTHER

## 2021-01-22 ENCOUNTER — TELEPHONE (OUTPATIENT)
Dept: INTERNAL MEDICINE CLINIC | Age: 61
End: 2021-01-22

## 2021-01-22 DIAGNOSIS — J01.00 ACUTE NON-RECURRENT MAXILLARY SINUSITIS: Primary | ICD-10-CM

## 2021-01-22 RX ORDER — AZITHROMYCIN 250 MG/1
250 TABLET, FILM COATED ORAL SEE ADMIN INSTRUCTIONS
Qty: 6 TABLET | Refills: 0 | Status: SHIPPED | OUTPATIENT
Start: 2021-01-22 | End: 2021-01-27

## 2021-01-27 ENCOUNTER — PATIENT MESSAGE (OUTPATIENT)
Dept: INTERNAL MEDICINE CLINIC | Age: 61
End: 2021-01-27

## 2021-01-27 DIAGNOSIS — M54.6 CHRONIC RIGHT-SIDED THORACIC BACK PAIN: ICD-10-CM

## 2021-01-27 DIAGNOSIS — G89.29 CHRONIC RIGHT-SIDED THORACIC BACK PAIN: ICD-10-CM

## 2021-01-27 RX ORDER — TIZANIDINE 2 MG/1
2 TABLET ORAL 3 TIMES DAILY PRN
Qty: 60 TABLET | Refills: 5 | Status: SHIPPED | OUTPATIENT
Start: 2021-01-27 | End: 2021-07-28

## 2021-01-27 NOTE — TELEPHONE ENCOUNTER
From: Colonel Emeterio Britt  To: Jagdish Kim MD  Sent: 1/27/2021 3:27 PM EST  Subject: Prescription Question    I tried to call refill in on the new medication you put me on for Rt side back spasms and the pharmacy tech said, tht my doctor canceled the refill prescription. The prescription was for Tizanidine 2mg tablets (60 tab) w/ 5 refills unsure if this was done by mistake. Pls let me know wht I need to do?    Thx Chang Britt

## 2021-03-12 RX ORDER — SUMATRIPTAN 100 MG/1
100 TABLET, FILM COATED ORAL
Qty: 9 TABLET | Refills: 5 | Status: SHIPPED | OUTPATIENT
Start: 2021-03-12 | End: 2021-10-19 | Stop reason: SDUPTHER

## 2021-04-30 ENCOUNTER — TELEPHONE (OUTPATIENT)
Dept: INTERNAL MEDICINE CLINIC | Age: 61
End: 2021-04-30

## 2021-07-08 ENCOUNTER — PATIENT MESSAGE (OUTPATIENT)
Dept: INTERNAL MEDICINE CLINIC | Age: 61
End: 2021-07-08

## 2021-07-09 RX ORDER — LEVOTHYROXINE SODIUM 0.07 MG/1
TABLET ORAL
Qty: 90 TABLET | Refills: 1 | Status: SHIPPED | OUTPATIENT
Start: 2021-07-09 | End: 2021-12-20

## 2021-07-09 RX ORDER — OMEPRAZOLE 40 MG/1
CAPSULE, DELAYED RELEASE ORAL
Qty: 90 CAPSULE | Refills: 1 | Status: SHIPPED | OUTPATIENT
Start: 2021-07-09 | End: 2021-12-20

## 2021-07-09 NOTE — TELEPHONE ENCOUNTER
From: Dionisio Britt  To: Arcelia Glover MD  Sent: 7/8/2021 9:53 PM EDT  Subject: Prescription Question    Dr Janina Rai,    Im in need of refills on my levothyroxine & omeprazole meds. Wondering , if its time to do blood work again? Or if you want me to make office appt first? I work this wkend 7/107/11. Could always have it drawn and then make follow up appt. Just let me know how you want me to go about it.     Thx so much , Rachael Jason

## 2021-07-28 ENCOUNTER — OFFICE VISIT (OUTPATIENT)
Dept: INTERNAL MEDICINE CLINIC | Age: 61
End: 2021-07-28
Payer: COMMERCIAL

## 2021-07-28 VITALS
OXYGEN SATURATION: 99 % | HEART RATE: 102 BPM | DIASTOLIC BLOOD PRESSURE: 76 MMHG | BODY MASS INDEX: 26.93 KG/M2 | WEIGHT: 152 LBS | RESPIRATION RATE: 14 BRPM | SYSTOLIC BLOOD PRESSURE: 136 MMHG

## 2021-07-28 DIAGNOSIS — M54.6 CHRONIC RIGHT-SIDED THORACIC BACK PAIN: ICD-10-CM

## 2021-07-28 DIAGNOSIS — L03.113 CELLULITIS OF RIGHT UPPER EXTREMITY: Primary | ICD-10-CM

## 2021-07-28 DIAGNOSIS — G89.29 CHRONIC RIGHT-SIDED THORACIC BACK PAIN: ICD-10-CM

## 2021-07-28 DIAGNOSIS — R21 RASH: ICD-10-CM

## 2021-07-28 PROCEDURE — 99213 OFFICE O/P EST LOW 20 MIN: CPT | Performed by: INTERNAL MEDICINE

## 2021-07-28 RX ORDER — FLUTICASONE PROPIONATE 50 MCG
SPRAY, SUSPENSION (ML) NASAL
Qty: 3 BOTTLE | Refills: 3 | Status: SHIPPED | OUTPATIENT
Start: 2021-07-28

## 2021-07-28 RX ORDER — TIZANIDINE 4 MG/1
4 TABLET ORAL 3 TIMES DAILY PRN
Qty: 90 TABLET | Refills: 3 | Status: SHIPPED | OUTPATIENT
Start: 2021-07-28

## 2021-07-28 RX ORDER — DICYCLOMINE HYDROCHLORIDE 10 MG/1
10 CAPSULE ORAL 4 TIMES DAILY
Qty: 120 CAPSULE | Refills: 3 | Status: SHIPPED | OUTPATIENT
Start: 2021-07-28

## 2021-07-28 RX ORDER — METHYLPREDNISOLONE ACETATE 80 MG/ML
80 INJECTION, SUSPENSION INTRA-ARTICULAR; INTRALESIONAL; INTRAMUSCULAR; SOFT TISSUE ONCE
Status: COMPLETED | OUTPATIENT
Start: 2021-07-28 | End: 2021-07-28

## 2021-07-28 RX ORDER — PREDNISONE 10 MG/1
TABLET ORAL
Qty: 20 TABLET | Refills: 0 | Status: SHIPPED | OUTPATIENT
Start: 2021-07-28 | End: 2022-03-02 | Stop reason: ALTCHOICE

## 2021-07-28 RX ORDER — CEPHALEXIN 500 MG/1
500 CAPSULE ORAL 3 TIMES DAILY
Qty: 30 CAPSULE | Refills: 0 | Status: SHIPPED | OUTPATIENT
Start: 2021-07-28 | End: 2021-08-07

## 2021-07-28 RX ADMIN — METHYLPREDNISOLONE ACETATE 80 MG: 80 INJECTION, SUSPENSION INTRA-ARTICULAR; INTRALESIONAL; INTRAMUSCULAR; SOFT TISSUE at 10:41

## 2021-07-28 NOTE — PROGRESS NOTES
Fritzi Alpers Dewine  1960 07/28/21    SUBJECTIVE:    Sunday pt was weeding. Monday she developed spots on the right arm, and later spots on the left. This is more painful than pruritic. The right arms had swelling, discharge of yellow fluid. She denies F/C, SOB, wheezing. OBJECTIVE:    /76   Pulse 102   Resp 14   Wt 152 lb (68.9 kg)   SpO2 99%   BMI 26.93 kg/m²     Physical Exam  Scattered erythematous papules on arms,legs. Right arm with erythema induration, warmth, mild tenderness    ASSESSMENT:    1. Cellulitis of right upper extremity    2. Rash    3. Chronic right-sided thoracic back pain        PLAN:    Federico Dumont was seen today for rash. Diagnoses and all orders for this visit:    Rash - I think there is a contact dermatitis, but the area on the right arm either is a very irritated contact dermatitis or a secondary cellulitis (which I suspect). Will Tx with kelfex, medrol shot, prednisone  -     methylPREDNISolone acetate (DEPO-MEDROL) injection 80 mg    Cellulitis of right upper extremity  -     cephALEXin (KEFLEX) 500 MG capsule; Take 1 capsule by mouth 3 times daily for 10 days  -     predniSONE (DELTASONE) 10 MG tablet; Take 4 tablets daily for 2 days, then 3 tablets daily for 2 days, then two tablets daily for 2 days, then one tablet daily for 2 days    Chronic right-sided thoracic back pain  -     tiZANidine (ZANAFLEX) 4 MG tablet; Take 1 tablet by mouth 3 times daily as needed (back pain)    Other orders  -     fluticasone (FLONASE) 50 MCG/ACT nasal spray; USE 1 SPRAY IN EACH NOSTRIL EVERY DAY  -     dicyclomine (BENTYL) 10 MG capsule;  Take 1 capsule by mouth 4 times daily

## 2021-07-30 ENCOUNTER — TELEPHONE (OUTPATIENT)
Dept: INTERNAL MEDICINE CLINIC | Age: 61
End: 2021-07-30

## 2021-08-20 RX ORDER — PHENAZOPYRIDINE HYDROCHLORIDE 100 MG/1
100 TABLET, FILM COATED ORAL 3 TIMES DAILY PRN
Qty: 30 TABLET | Refills: 0 | Status: SHIPPED | OUTPATIENT
Start: 2021-08-20 | End: 2022-07-25 | Stop reason: SDUPTHER

## 2021-09-01 ENCOUNTER — OFFICE VISIT (OUTPATIENT)
Dept: INTERNAL MEDICINE CLINIC | Age: 61
End: 2021-09-01
Payer: COMMERCIAL

## 2021-09-01 VITALS
WEIGHT: 154 LBS | OXYGEN SATURATION: 98 % | HEART RATE: 104 BPM | SYSTOLIC BLOOD PRESSURE: 160 MMHG | DIASTOLIC BLOOD PRESSURE: 90 MMHG | RESPIRATION RATE: 18 BRPM | BODY MASS INDEX: 27.28 KG/M2

## 2021-09-01 DIAGNOSIS — R21 RASH: Primary | ICD-10-CM

## 2021-09-01 DIAGNOSIS — L29.9 PRURITIC DERMATITIS: ICD-10-CM

## 2021-09-01 PROCEDURE — 99213 OFFICE O/P EST LOW 20 MIN: CPT | Performed by: INTERNAL MEDICINE

## 2021-09-01 RX ORDER — PREDNISONE 10 MG/1
TABLET ORAL
Qty: 20 TABLET | Refills: 1 | Status: SHIPPED | OUTPATIENT
Start: 2021-09-01 | End: 2022-03-02 | Stop reason: ALTCHOICE

## 2021-09-01 RX ORDER — METHYLPREDNISOLONE ACETATE 80 MG/ML
80 INJECTION, SUSPENSION INTRA-ARTICULAR; INTRALESIONAL; INTRAMUSCULAR; SOFT TISSUE ONCE
Status: COMPLETED | OUTPATIENT
Start: 2021-09-01 | End: 2021-09-01

## 2021-09-01 RX ADMIN — METHYLPREDNISOLONE ACETATE 80 MG: 80 INJECTION, SUSPENSION INTRA-ARTICULAR; INTRALESIONAL; INTRAMUSCULAR; SOFT TISSUE at 11:39

## 2021-09-01 NOTE — PROGRESS NOTES
David Britt  1960 09/01/21    SUBJECTIVE:    Pt with a pruritic trash on her arms, legs. She was working in her yard Monday evening. She has not treated this. She denies any SOB, wheezing. OBJECTIVE:    BP (!) 160/90   Pulse 104   Resp 18   Wt 154 lb (69.9 kg)   SpO2 98%   BMI 27.28 kg/m²     Physical Exam  Scattered areas of erythema on arms legs. ASSESSMENT:    1. Rash    2. Pruritic dermatitis        PLAN:    London Mcgill was seen today for poison ivy. Diagnoses and all orders for this visit:    Rash - Tx with prednisone and medrol IM for likely poison ivy  -     methylPREDNISolone acetate (DEPO-MEDROL) injection 80 mg  -     predniSONE (DELTASONE) 10 MG tablet;  Take 4 tablets daily for 2 days, then 3 tablets daily for 2 days, then two tablets daily for 2 days, then one tablet daily for 2 days    Pruritic dermatitis  -     methylPREDNISolone acetate (DEPO-MEDROL) injection 80 mg

## 2021-10-19 RX ORDER — ONDANSETRON 4 MG/1
4 TABLET, FILM COATED ORAL EVERY 8 HOURS PRN
Qty: 30 TABLET | Refills: 0 | Status: SHIPPED | OUTPATIENT
Start: 2021-10-19

## 2021-10-19 RX ORDER — SUMATRIPTAN 100 MG/1
100 TABLET, FILM COATED ORAL
Qty: 9 TABLET | Refills: 5 | Status: SHIPPED | OUTPATIENT
Start: 2021-10-19 | End: 2022-03-02 | Stop reason: SDUPTHER

## 2021-12-20 RX ORDER — LEVOTHYROXINE SODIUM 0.07 MG/1
TABLET ORAL
Qty: 90 TABLET | Refills: 1 | Status: SHIPPED | OUTPATIENT
Start: 2021-12-20 | End: 2022-03-02 | Stop reason: SDUPTHER

## 2021-12-20 RX ORDER — OMEPRAZOLE 40 MG/1
CAPSULE, DELAYED RELEASE ORAL
Qty: 90 CAPSULE | Refills: 1 | Status: SHIPPED | OUTPATIENT
Start: 2021-12-20 | End: 2022-03-02 | Stop reason: SDUPTHER

## 2022-02-18 DIAGNOSIS — M54.6 CHRONIC RIGHT-SIDED THORACIC BACK PAIN: ICD-10-CM

## 2022-02-18 DIAGNOSIS — G89.29 CHRONIC RIGHT-SIDED THORACIC BACK PAIN: ICD-10-CM

## 2022-02-18 RX ORDER — TIZANIDINE 4 MG/1
TABLET ORAL
Qty: 90 TABLET | Refills: 3 | OUTPATIENT
Start: 2022-02-18

## 2022-02-18 RX ORDER — DICYCLOMINE HYDROCHLORIDE 10 MG/1
CAPSULE ORAL
Qty: 120 CAPSULE | Refills: 3 | OUTPATIENT
Start: 2022-02-18

## 2022-02-25 DIAGNOSIS — E78.2 MIXED HYPERLIPIDEMIA: ICD-10-CM

## 2022-02-25 DIAGNOSIS — E03.4 HYPOTHYROIDISM DUE TO ACQUIRED ATROPHY OF THYROID: Primary | ICD-10-CM

## 2022-02-27 ENCOUNTER — HOSPITAL ENCOUNTER (OUTPATIENT)
Age: 62
Discharge: HOME | End: 2022-02-27
Payer: COMMERCIAL

## 2022-02-27 LAB
ALBUMIN SERPL-MCNC: 4.4 GM/DL (ref 3.4–5)
ALP BLD-CCNC: 94 IU/L (ref 40–128)
ALT SERPL-CCNC: 16 U/L (ref 10–40)
ANION GAP SERPL CALCULATED.3IONS-SCNC: 14 MMOL/L (ref 4–16)
AST SERPL-CCNC: 18 IU/L (ref 15–37)
BASOPHILS ABSOLUTE: 0.1 K/CU MM
BASOPHILS RELATIVE PERCENT: 0.5 % (ref 0–1)
BILIRUB SERPL-MCNC: 0.2 MG/DL (ref 0–1)
BUN BLDV-MCNC: 18 MG/DL (ref 6–23)
CALCIUM SERPL-MCNC: 9.7 MG/DL (ref 8.3–10.6)
CHLORIDE BLD-SCNC: 100 MMOL/L (ref 99–110)
CHOLESTEROL: 177 MG/DL
CO2: 23 MMOL/L (ref 21–32)
CREAT SERPL-MCNC: 0.7 MG/DL (ref 0.6–1.1)
DIFFERENTIAL TYPE: ABNORMAL
EOSINOPHILS ABSOLUTE: 0.1 K/CU MM
EOSINOPHILS RELATIVE PERCENT: 0.4 % (ref 0–3)
GFR AFRICAN AMERICAN: >60 ML/MIN/1.73M2
GFR NON-AFRICAN AMERICAN: >60 ML/MIN/1.73M2
GLUCOSE BLD-MCNC: 105 MG/DL (ref 70–99)
HCT VFR BLD CALC: 40.4 % (ref 37–47)
HDLC SERPL-MCNC: 75 MG/DL
HEMOGLOBIN: 12.7 GM/DL (ref 12.5–16)
IMMATURE NEUTROPHIL %: 0.7 % (ref 0–0.43)
LDL CHOLESTEROL CALCULATED: 74 MG/DL
LYMPHOCYTES ABSOLUTE: 1.8 K/CU MM
LYMPHOCYTES RELATIVE PERCENT: 14 % (ref 24–44)
MCH RBC QN AUTO: 27.3 PG (ref 27–31)
MCHC RBC AUTO-ENTMCNC: 31.4 % (ref 32–36)
MCV RBC AUTO: 86.9 FL (ref 78–100)
MONOCYTES ABSOLUTE: 0.6 K/CU MM
MONOCYTES RELATIVE PERCENT: 4.9 % (ref 0–4)
NUCLEATED RBC %: 0 %
PDW BLD-RTO: 13.6 % (ref 11.7–14.9)
PLATELET # BLD: 436 K/CU MM (ref 140–440)
PMV BLD AUTO: 8.9 FL (ref 7.5–11.1)
POTASSIUM SERPL-SCNC: 4.3 MMOL/L (ref 3.5–5.1)
RBC # BLD: 4.65 M/CU MM (ref 4.2–5.4)
SEGMENTED NEUTROPHILS ABSOLUTE COUNT: 10.5 K/CU MM
SEGMENTED NEUTROPHILS RELATIVE PERCENT: 79.5 % (ref 36–66)
SODIUM BLD-SCNC: 137 MMOL/L (ref 135–145)
TOTAL IMMATURE NEUTOROPHIL: 0.09 K/CU MM
TOTAL NUCLEATED RBC: 0 K/CU MM
TOTAL PROTEIN: 7.3 GM/DL (ref 6.4–8.2)
TRIGL SERPL-MCNC: 142 MG/DL
TSH HIGH SENSITIVITY: 4.47 UIU/ML (ref 0.27–4.2)
WBC # BLD: 13.2 K/CU MM (ref 4–10.5)

## 2022-02-27 PROCEDURE — 85025 COMPLETE CBC W/AUTO DIFF WBC: CPT

## 2022-02-27 PROCEDURE — 80053 COMPREHEN METABOLIC PANEL: CPT

## 2022-02-27 PROCEDURE — 84443 ASSAY THYROID STIM HORMONE: CPT

## 2022-02-27 PROCEDURE — 36415 COLL VENOUS BLD VENIPUNCTURE: CPT

## 2022-02-27 PROCEDURE — 80061 LIPID PANEL: CPT

## 2022-03-02 ENCOUNTER — TELEMEDICINE (OUTPATIENT)
Dept: INTERNAL MEDICINE CLINIC | Age: 62
End: 2022-03-02
Payer: COMMERCIAL

## 2022-03-02 DIAGNOSIS — Z12.31 ENCOUNTER FOR SCREENING MAMMOGRAM FOR BREAST CANCER: ICD-10-CM

## 2022-03-02 DIAGNOSIS — K21.9 GASTROESOPHAGEAL REFLUX DISEASE WITHOUT ESOPHAGITIS: ICD-10-CM

## 2022-03-02 DIAGNOSIS — E03.4 HYPOTHYROIDISM DUE TO ACQUIRED ATROPHY OF THYROID: ICD-10-CM

## 2022-03-02 DIAGNOSIS — Z00.00 ENCOUNTER FOR PREVENTIVE CARE: Primary | ICD-10-CM

## 2022-03-02 PROCEDURE — 99396 PREV VISIT EST AGE 40-64: CPT | Performed by: INTERNAL MEDICINE

## 2022-03-02 RX ORDER — SUMATRIPTAN 100 MG/1
100 TABLET, FILM COATED ORAL
Qty: 9 TABLET | Refills: 5 | Status: SHIPPED | OUTPATIENT
Start: 2022-03-02 | End: 2022-06-06

## 2022-03-02 RX ORDER — LEVOTHYROXINE SODIUM 0.07 MG/1
75 TABLET ORAL DAILY
Qty: 90 TABLET | Refills: 3 | Status: SHIPPED | OUTPATIENT
Start: 2022-03-02

## 2022-03-02 RX ORDER — OMEPRAZOLE 40 MG/1
40 CAPSULE, DELAYED RELEASE ORAL DAILY
Qty: 90 CAPSULE | Refills: 3 | Status: SHIPPED | OUTPATIENT
Start: 2022-03-02

## 2022-03-02 RX ORDER — LIDOCAINE 50 MG/G
1 PATCH TOPICAL DAILY
Qty: 30 PATCH | Refills: 2 | Status: SHIPPED | OUTPATIENT
Start: 2022-03-02 | End: 2022-04-01

## 2022-03-02 NOTE — PROGRESS NOTES
3/2/2022    TELEHEALTH EVALUATION -- Audio/Visual (During DDOLN-26 public health emergency)    HPI:    Shaun Aguilera (:  1960) has requested an audio/video evaluation for the following concern(s):    Pt continues on synthroid for hypothyroidism, but she last took synthroid 6-8 weeks ago. Patient's reflux well controlled on current medications. Patient denies any blood in stool black stool, heartburn, reflux. She continues to use imitrex for migraines - once every 2 weeks. She declines preventative therapy. She continues to have regular exercise - hiking. She continues on adderall for the ADHD. She uses rare ativan for panic attacks. Review of Systems    Prior to Visit Medications    Medication Sig Taking? Authorizing Provider   SUMAtriptan (IMITREX) 100 MG tablet Take 1 tablet by mouth once as needed for Migraine Yes Cate Nicole MD   levothyroxine (SYNTHROID) 75 MCG tablet Take 1 tablet by mouth Daily Yes Cate Nicole MD   omeprazole (PRILOSEC) 40 MG delayed release capsule Take 1 capsule by mouth daily Yes Cate Nicole MD   lidocaine (LIDODERM) 5 % Place 1 patch onto the skin daily 12 hours on, 12 hours off.  Yes Cate Nicole MD   ondansetron (ZOFRAN) 4 MG tablet Take 1 tablet by mouth every 8 hours as needed for Nausea or Vomiting Yes Cate Nicole MD   phenazopyridine (PYRIDIUM) 100 MG tablet Take 1 tablet by mouth 3 times daily as needed for Pain Yes Cate Nicole MD   fluticasone [facility]) 50 MCG/ACT nasal spray USE 1 SPRAY IN EACH NOSTRIL EVERY DAY Yes Cate Nicole MD   dicyclomine (BENTYL) 10 MG capsule Take 1 capsule by mouth 4 times daily Yes Cate Nicole MD   tiZANidine (ZANAFLEX) 4 MG tablet Take 1 tablet by mouth 3 times daily as needed (back pain) Yes Cate Nicole MD   amphetamine-dextroamphetamine (ADDERALL) 15 MG tablet  Yes Historical Provider, MD   clobetasol (TEMOVATE) 0.05 % ointment Apply topically 2 times daily. Yes Rigo Pendleton, DO   mometasone (ELOCON) 0.1 % cream Apply topically daily. Yes Berry Opitz, MD       Social History     Tobacco Use    Smoking status: Never Smoker    Smokeless tobacco: Never Used   Substance Use Topics    Alcohol use: No    Drug use: No            PHYSICAL EXAMINATION:    Constitutional: [x] Appears well-developed and well-nourished [x] No apparent distress      [] Abnormal-   Mental status  [x] Alert and awake  [x] Oriented to person/place/time [x]Able to follow commands             Psychiatric:       [x] Normal Affect [x] No Hallucinations      ASSESSMENT/PLAN:  1. Encounter for preventive care - check labs; cont exercise; arrange mammo; declines c-scope for now; encourge tdap, shingles shot  - SHANTELL DIGITAL SCREEN W OR WO CAD BILATERAL; Future  - CBC with Auto Differential; Future  - TSH; Future    2. Encounter for screening mammogram for breast cancer*  - SHANTELL DIGITAL SCREEN W OR WO CAD BILATERAL; Future    3. Gastroesophageal reflux disease without esophagitis  - resuem PPI    4. Hypothyroidism due to acquired atrophy of thyroid - recheck TSH 6 weeks  - CBC with Auto Differential; Future  - TSH; Future      Return in about 1 year (around 3/2/2023). Paige [place], was evaluated through a synchronous (real-time) audio-video encounter. The patient (or guardian if applicable) is aware that this is a billable service, which includes applicable co-pays. This Virtual Visit was conducted with patient's (and/or legal guardian's) consent. The visit was conducted pursuant to the emergency declaration under the [facility], [address] [place] [address] [facility] authority and the [employer] General Act. Patient identification was verified, and a caregiver was present when appropriate.  The patient was located at home in a state where the provider was licensed to provide care. Total time spent on this encounter: Not billed by time    --Megan Barrett MD on 3/2/2022 at 12:52 PM    An electronic signature was used to authenticate this note.

## 2022-04-20 ENCOUNTER — TELEMEDICINE (OUTPATIENT)
Dept: INTERNAL MEDICINE CLINIC | Age: 62
End: 2022-04-20
Payer: COMMERCIAL

## 2022-04-20 DIAGNOSIS — J40 BRONCHITIS: Primary | ICD-10-CM

## 2022-04-20 PROCEDURE — 99213 OFFICE O/P EST LOW 20 MIN: CPT | Performed by: INTERNAL MEDICINE

## 2022-04-20 RX ORDER — BENZONATATE 200 MG/1
200 CAPSULE ORAL 3 TIMES DAILY PRN
Qty: 30 CAPSULE | Refills: 0 | Status: SHIPPED | OUTPATIENT
Start: 2022-04-20 | End: 2022-04-27

## 2022-04-20 RX ORDER — LIDOCAINE 50 MG/G
1 PATCH TOPICAL DAILY
COMMUNITY

## 2022-04-20 RX ORDER — AZITHROMYCIN 250 MG/1
250 TABLET, FILM COATED ORAL SEE ADMIN INSTRUCTIONS
Qty: 6 TABLET | Refills: 0 | Status: SHIPPED | OUTPATIENT
Start: 2022-04-20 | End: 2022-04-25

## 2022-04-20 RX ORDER — PREDNISONE 10 MG/1
TABLET ORAL
Qty: 20 TABLET | Refills: 0 | Status: SHIPPED | OUTPATIENT
Start: 2022-04-20 | End: 2022-05-12

## 2022-04-20 NOTE — PROGRESS NOTES
2022    TELEHEALTH EVALUATION -- Audio/Visual (During IZIVX-73 public health emergency)    HPI:    Jesus Mccall (:  1960) has requested an audio/video evaluation for the following concern(s):    Monday pt developed a typical migraine. She used imitrex with no benefit. She has since developed sore throat, cough productive of yellow mucous. She denies fevers, chills, ear pain, sinus pain, SOB, wheezing, N/V, diarrhea, rhinorrhea, nasal congestion. She has not treated the symptoms. Review of Systems    Prior to Visit Medications    Medication Sig Taking? Authorizing Provider   lidocaine (LIDODERM) 5 % Place 1 patch onto the skin daily 12 hours on, 12 hours off.  Yes Historical Provider, MD   benzonatate (TESSALON) 200 MG capsule Take 1 capsule by mouth 3 times daily as needed for Cough Yes Tracey Lares MD   azithromycin (ZITHROMAX) 250 MG tablet Take 1 tablet by mouth See Admin Instructions for 5 days 500mg on day 1 followed by 250mg on days 2 - 5 Yes Tracey Lares MD   predniSONE (DELTASONE) 10 MG tablet Take 4 tablets daily for 2 days, then 3 tablets daily for 2 days, then two tablets daily for 2 days, then one tablet daily for 2 days Yes Tracey Lares MD   levothyroxine (SYNTHROID) 75 MCG tablet Take 1 tablet by mouth Daily Yes Tracey Lares MD   omeprazole (PRILOSEC) 40 MG delayed release capsule Take 1 capsule by mouth daily Yes Tracey Lares MD   ondansetron (ZOFRAN) 4 MG tablet Take 1 tablet by mouth every 8 hours as needed for Nausea or Vomiting Yes Tracey Lares MD   phenazopyridine (PYRIDIUM) 100 MG tablet Take 1 tablet by mouth 3 times daily as needed for Pain Yes Tracey Lares MD   fluticasone (FLONASE) 50 MCG/ACT nasal spray USE 1 SPRAY IN EACH NOSTRIL EVERY DAY Yes Tracey Lares MD   dicyclomine (BENTYL) 10 MG capsule Take 1 capsule by mouth 4 times daily Yes Tracey Lares MD amphetamine-dextroamphetamine (ADDERALL) 15 MG tablet  Yes Historical Provider, MD   SUMAtriptan (IMITREX) 100 MG tablet Take 1 tablet by mouth once as needed for Migraine  Mickie Lockhart MD   tiZANidine (ZANAFLEX) 4 MG tablet Take 1 tablet by mouth 3 times daily as needed (back pain)  Patient not taking: Reported on 4/20/2022  Mickie Lockhart MD   clobetasol (TEMOVATE) 0.05 % ointment Apply topically 2 times daily. Rigo Beltran DO   mometasone (ELOCON) 0.1 % cream Apply topically daily. Kandis Weldon MD       Social History     Tobacco Use    Smoking status: Never Smoker    Smokeless tobacco: Never Used   Substance Use Topics    Alcohol use: No    Drug use: No          PHYSICAL EXAMINATION:    Constitutional: [x] Appears well-developed and well-nourished [x] No apparent distress      [] Abnormal-   Mental status  [x] Alert and awake  [x] Oriented to person/place/time [x]Able to follow commands             Psychiatric:       [x] Normal Affect [x] No Hallucinations      ASSESSMENT/PLAN:  1. Bronchitis - Tx with azith and tessalon, prednisone if symptoms are not improving  - benzonatate (TESSALON) 200 MG capsule; Take 1 capsule by mouth 3 times daily as needed for Cough  Dispense: 30 capsule; Refill: 0  - azithromycin (ZITHROMAX) 250 MG tablet; Take 1 tablet by mouth See Admin Instructions for 5 days 500mg on day 1 followed by 250mg on days 2 - 5  Dispense: 6 tablet; Refill: 0  - predniSONE (DELTASONE) 10 MG tablet; Take 4 tablets daily for 2 days, then 3 tablets daily for 2 days, then two tablets daily for 2 days, then one tablet daily for 2 days  Dispense: 20 tablet; Refill: 0      No follow-ups on file. Yanna James, was evaluated through a synchronous (real-time) audio-video encounter. The patient (or guardian if applicable) is aware that this is a billable service, which includes applicable co-pays.  This Virtual Visit was conducted with patient's (and/or legal guardian's) consent. The visit was conducted pursuant to the emergency declaration under the [address] [place] [address] authority and the [employer] General Act. Patient identification was verified, and a caregiver was present when appropriate. The patient was located at home in a state where the provider was licensed to provide care. Total time spent on this encounter: Not billed by time    --Victor Manuel Baires MD on 4/20/2022 at 5:02 PM    An electronic signature was used to authenticate this note.

## 2022-05-12 ENCOUNTER — PATIENT MESSAGE (OUTPATIENT)
Dept: INTERNAL MEDICINE CLINIC | Age: 62
End: 2022-05-12

## 2022-05-12 RX ORDER — IBUPROFEN 800 MG/1
800 TABLET ORAL 2 TIMES DAILY PRN
Qty: 60 TABLET | Refills: 5 | Status: SHIPPED | OUTPATIENT
Start: 2022-05-12

## 2022-05-12 NOTE — TELEPHONE ENCOUNTER
From: Sarahi Mata  To: Dr. Kim Sorto: 5/12/2022 10:29 AM EDT  Subject: Motrin    Dr Carolann Thompson if it would be possible to get a prescription for 800mg Motrin? So, I know I shouldve but, I happened to have taken one of my daughter in laws while visiting [place] for my back after working in yard. I could notice the difference in relief from the prescription Motrin verses over counter. So is that something you would consider calling me into pharmacy to use PRN? Please and thanks .    GRACIELA/ Jean-Paul Quiñones

## 2022-06-06 RX ORDER — SUMATRIPTAN 100 MG/1
TABLET, FILM COATED ORAL
Qty: 9 TABLET | Refills: 5 | Status: SHIPPED | OUTPATIENT
Start: 2022-06-06

## 2022-06-07 ENCOUNTER — PATIENT MESSAGE (OUTPATIENT)
Dept: INTERNAL MEDICINE CLINIC | Age: 62
End: 2022-06-07

## 2022-06-07 RX ORDER — PREDNISONE 10 MG/1
TABLET ORAL
Qty: 20 TABLET | Refills: 0 | Status: SHIPPED | OUTPATIENT
Start: 2022-06-07

## 2022-06-07 NOTE — TELEPHONE ENCOUNTER
From: Isabela Pike  To: Dr. Loc Saldivar: 6/7/2022 7:40 AM EDT  Subject: Poison again    Dr Ronna Antunez,    Just wondering if your able to call me in a prescription for predisone pls. Of course somewhere in my flower beds Kash got poison Ivy and its all over my left arm at this time. Or if you feel I would benefit better from a shot tht would be great and I can make an appt . Attached is picture of left arm. Im also working today 7a-7p.     Thanks so much ,   Lion Owens

## 2022-07-25 RX ORDER — PHENAZOPYRIDINE HYDROCHLORIDE 100 MG/1
100 TABLET, FILM COATED ORAL 3 TIMES DAILY PRN
Qty: 30 TABLET | Refills: 0 | Status: SHIPPED | OUTPATIENT
Start: 2022-07-25 | End: 2023-07-25

## 2022-10-31 ENCOUNTER — COMMUNITY OUTREACH (OUTPATIENT)
Dept: INTERNAL MEDICINE CLINIC | Age: 62
End: 2022-10-31

## 2022-10-31 NOTE — PROGRESS NOTES
Patient's HM shows they are overdue for Mammogram, Colorectal Screening and Cervical Cancer Screening. [employer] and  files searched. No results to attach to order nor HM updated.

## 2022-11-04 ENCOUNTER — COMMUNITY OUTREACH (OUTPATIENT)
Dept: INTERNAL MEDICINE CLINIC | Age: 62
End: 2022-11-04

## 2022-12-14 RX ORDER — SUMATRIPTAN 100 MG/1
100 TABLET, FILM COATED ORAL
Qty: 9 TABLET | Refills: 5 | Status: SHIPPED | OUTPATIENT
Start: 2022-12-14 | End: 2022-12-14

## 2022-12-14 RX ORDER — PHENAZOPYRIDINE HYDROCHLORIDE 100 MG/1
100 TABLET, FILM COATED ORAL 3 TIMES DAILY PRN
Qty: 30 TABLET | Refills: 0 | Status: SHIPPED | OUTPATIENT
Start: 2022-12-14 | End: 2023-12-14

## 2023-01-26 RX ORDER — PHENAZOPYRIDINE HYDROCHLORIDE 100 MG/1
100 TABLET, FILM COATED ORAL 3 TIMES DAILY PRN
Qty: 30 TABLET | Refills: 0 | Status: SHIPPED | OUTPATIENT
Start: 2023-01-26 | End: 2024-01-26

## 2023-01-26 RX ORDER — ONDANSETRON 4 MG/1
4 TABLET, FILM COATED ORAL EVERY 8 HOURS PRN
Qty: 30 TABLET | Refills: 0 | Status: SHIPPED | OUTPATIENT
Start: 2023-01-26

## 2023-03-13 ENCOUNTER — OFFICE VISIT (OUTPATIENT)
Dept: INTERNAL MEDICINE CLINIC | Age: 63
End: 2023-03-13
Payer: COMMERCIAL

## 2023-03-13 VITALS
SYSTOLIC BLOOD PRESSURE: 146 MMHG | OXYGEN SATURATION: 97 % | WEIGHT: 149.2 LBS | HEART RATE: 117 BPM | BODY MASS INDEX: 26.44 KG/M2 | DIASTOLIC BLOOD PRESSURE: 86 MMHG | HEIGHT: 63 IN

## 2023-03-13 DIAGNOSIS — K21.9 GASTROESOPHAGEAL REFLUX DISEASE WITHOUT ESOPHAGITIS: ICD-10-CM

## 2023-03-13 DIAGNOSIS — C43.61 MALIGNANT MELANOMA OF RIGHT UPPER EXTREMITY INCLUDING SHOULDER (HCC): ICD-10-CM

## 2023-03-13 DIAGNOSIS — Z12.31 ENCOUNTER FOR SCREENING MAMMOGRAM FOR BREAST CANCER: ICD-10-CM

## 2023-03-13 DIAGNOSIS — Z00.00 ENCOUNTER FOR PREVENTIVE CARE: Primary | ICD-10-CM

## 2023-03-13 DIAGNOSIS — E03.4 HYPOTHYROIDISM DUE TO ACQUIRED ATROPHY OF THYROID: ICD-10-CM

## 2023-03-13 DIAGNOSIS — I73.00 RAYNAUD'S PHENOMENON WITHOUT GANGRENE: ICD-10-CM

## 2023-03-13 PROCEDURE — 99396 PREV VISIT EST AGE 40-64: CPT | Performed by: INTERNAL MEDICINE

## 2023-03-13 RX ORDER — OMEPRAZOLE 40 MG/1
40 CAPSULE, DELAYED RELEASE ORAL DAILY
Qty: 90 CAPSULE | Refills: 3 | Status: SHIPPED | OUTPATIENT
Start: 2023-03-13

## 2023-03-13 RX ORDER — METHOCARBAMOL 500 MG/1
500 TABLET, FILM COATED ORAL 4 TIMES DAILY
Qty: 40 TABLET | Refills: 0 | Status: SHIPPED | OUTPATIENT
Start: 2023-03-13 | End: 2023-03-23

## 2023-03-13 RX ORDER — LEVOTHYROXINE SODIUM 0.07 MG/1
75 TABLET ORAL DAILY
Qty: 90 TABLET | Refills: 3 | Status: SHIPPED | OUTPATIENT
Start: 2023-03-13

## 2023-03-13 ASSESSMENT — PATIENT HEALTH QUESTIONNAIRE - PHQ9
SUM OF ALL RESPONSES TO PHQ9 QUESTIONS 1 & 2: 0
SUM OF ALL RESPONSES TO PHQ QUESTIONS 1-9: 0
1. LITTLE INTEREST OR PLEASURE IN DOING THINGS: 0
SUM OF ALL RESPONSES TO PHQ QUESTIONS 1-9: 0
2. FEELING DOWN, DEPRESSED OR HOPELESS: 0

## 2023-03-13 NOTE — PROGRESS NOTES
Marylu Herbert  1960 03/13/23    SUBJECTIVE:      Pt with changing colors in her fingers. In the cold they will become white and numb, but other times there are warm and red. She denies any sores. She denies erythema, warmth of her joints; rashes; focal N/W. She is on adderall but symptoms preceded the med. Pt continues on synthroid for hypothyroidism    Patient's reflux well controlled on current medications. Patient denies any blood in stool black stool, heartburn, reflux. She has not followed with dermatology for the melanoma. OBJECTIVE:    BP (!) 146/86 (Site: Left Upper Arm, Position: Sitting, Cuff Size: Medium Adult)   Pulse (!) 117   Ht 5' 3\" (1.6 m)   Wt 149 lb 3.2 oz (67.7 kg)   SpO2 97%   BMI 26.43 kg/m²     Physical Exam  Constitutional:       Appearance: She is well-developed. Eyes:      General: No scleral icterus. Conjunctiva/sclera: Conjunctivae normal.   Neck:      Thyroid: No thyromegaly. Trachea: No tracheal deviation. Cardiovascular:      Rate and Rhythm: Normal rate and regular rhythm. Heart sounds: No murmur heard. No friction rub. No gallop. Pulmonary:      Effort: No respiratory distress. Breath sounds: No wheezing or rales. Abdominal:      General: Bowel sounds are normal. There is no distension. Palpations: Abdomen is soft. There is no hepatomegaly or mass. Tenderness: There is no abdominal tenderness. There is no guarding or rebound. Musculoskeletal:      Cervical back: Neck supple. Lymphadenopathy:      Cervical: No cervical adenopathy. Skin:     Nails: There is no clubbing. Neurological:      Mental Status: She is alert and oriented to person, place, and time. Psychiatric:         Behavior: Behavior normal.         Judgment: Judgment normal.       ASSESSMENT:    1. Encounter for preventive care    2. Gastroesophageal reflux disease without esophagitis    3.  Hypothyroidism due to acquired atrophy of thyroid    4. Raynaud's phenomenon without gangrene    5. 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 Encounter for screening mammogram for breast cancer        PLAN:    Asaf Krueger was seen today for annual exam.    Diagnoses and all orders for this visit:    Encounter for preventive care - BP elevated today but in general well controlled; check labs; check mammo; declines c-scope; encourage f/u with derm; check labs; cont exercise  -     CBC with Auto Differential; Future  -     Iron and TIBC; Future  -     Ferritin; Future  -     Comprehensive Metabolic Panel; Future  -     Lipid Panel; Future    Gastroesophageal reflux disease without esophagitis - discussed long term complications of med; she will try taking med every other day  -     omeprazole (PRILOSEC) 40 MG delayed release capsule; Take 1 capsule by mouth daily    Hypothyroidism due to acquired atrophy of thyroid - check labs  -     levothyroxine (SYNTHROID) 75 MCG tablet; Take 1 tablet by mouth Daily  -     TSH; Future    Raynaud's phenomenon without gangrene - will check labs for secondary raynaud's; discussed Tx with CCB, pt declines  -     Rheumatoid Factor; Future  -     C-Reactive Protein; Future  -     Sedimentation Rate; Future  -     DELMY Reflex to Antibody Cascade; Future    Malignant melanoma of right upper extremity including shoulder ([address] - refer to derm  -     [facility] - Javad Currie, DO, Dermatology, [address]    Encounter for screening mammogram for breast cancer  -     SHANTELL DIGITAL SCREEN W OR WO CAD BILATERAL; Future    Other orders  -     methocarbamol (ROBAXIN) 500 MG tablet;  Take 1 tablet by mouth 4 times daily for 10 days

## 2023-03-28 ENCOUNTER — HOSPITAL ENCOUNTER (OUTPATIENT)
Age: 63
Discharge: HOME | End: 2023-03-28
Payer: COMMERCIAL

## 2023-03-28 LAB
ALBUMIN SERPL-MCNC: 4.5 GM/DL (ref 3.4–5)
ALP BLD-CCNC: 86 IU/L (ref 40–129)
ALT SERPL-CCNC: 15 U/L (ref 10–40)
ANION GAP SERPL CALCULATED.3IONS-SCNC: 14 MMOL/L (ref 4–16)
AST SERPL-CCNC: 24 IU/L (ref 15–37)
BASOPHILS ABSOLUTE: 0.1 K/CU MM
BASOPHILS RELATIVE PERCENT: 0.9 % (ref 0–1)
BILIRUB SERPL-MCNC: 0.4 MG/DL (ref 0–1)
BUN SERPL-MCNC: 22 MG/DL (ref 6–23)
CALCIUM SERPL-MCNC: 9.6 MG/DL (ref 8.3–10.6)
CHLORIDE BLD-SCNC: 100 MMOL/L (ref 99–110)
CHOLEST SERPL-MCNC: 204 MG/DL
CO2: 26 MMOL/L (ref 21–32)
CREAT SERPL-MCNC: 0.7 MG/DL (ref 0.6–1.1)
CRP SERPL HS-MCNC: 3 MG/L
DIFFERENTIAL TYPE: ABNORMAL
EOSINOPHILS ABSOLUTE: 0.1 K/CU MM
EOSINOPHILS RELATIVE PERCENT: 2 % (ref 0–3)
ERYTHROCYTE SEDIMENTATION RATE: 6 MM/HR (ref 0–30)
FERRITIN: 23 NG/ML (ref 15–150)
GFR SERPL CREATININE-BSD FRML MDRD: >60 ML/MIN/1.73M2
GLUCOSE SERPL-MCNC: 94 MG/DL (ref 70–99)
HCT VFR BLD CALC: 45.2 % (ref 37–47)
HDLC SERPL-MCNC: 69 MG/DL
HEMOGLOBIN: 14.2 GM/DL (ref 12.5–16)
IMMATURE NEUTROPHIL %: 0.3 % (ref 0–0.43)
IRON: 111 UG/DL (ref 37–145)
LDLC SERPL CALC-MCNC: 115 MG/DL
LYMPHOCYTES ABSOLUTE: 1.9 K/CU MM
LYMPHOCYTES RELATIVE PERCENT: 29.4 % (ref 24–44)
MCH RBC QN AUTO: 29.6 PG (ref 27–31)
MCHC RBC AUTO-ENTMCNC: 31.4 % (ref 32–36)
MCV RBC AUTO: 94.4 FL (ref 78–100)
MONOCYTES ABSOLUTE: 0.7 K/CU MM
MONOCYTES RELATIVE PERCENT: 10.5 % (ref 0–4)
NUCLEATED RBC %: 0 %
PCT TRANSFERRIN: 29 % (ref 10–44)
PDW BLD-RTO: 13.9 % (ref 11.7–14.9)
PLATELET # BLD: 396 K/CU MM (ref 140–440)
PMV BLD AUTO: 9.3 FL (ref 7.5–11.1)
POTASSIUM SERPL-SCNC: 4.3 MMOL/L (ref 3.5–5.1)
RBC # BLD: 4.79 M/CU MM (ref 4.2–5.4)
SEGMENTED NEUTROPHILS ABSOLUTE COUNT: 3.6 K/CU MM
SEGMENTED NEUTROPHILS RELATIVE PERCENT: 56.9 % (ref 36–66)
SODIUM BLD-SCNC: 140 MMOL/L (ref 135–145)
TOTAL IMMATURE NEUTOROPHIL: 0.02 K/CU MM
TOTAL IRON BINDING CAPACITY: 385 UG/DL (ref 250–450)
TOTAL NUCLEATED RBC: 0 K/CU MM
TOTAL PROTEIN: 7.1 GM/DL (ref 6.4–8.2)
TRIGL SERPL-MCNC: 102 MG/DL
TSH SERPL DL<=0.005 MIU/L-ACNC: 1.85 UIU/ML (ref 0.27–4.2)
UNSATURATED IRON BINDING CAPACITY: 274 UG/DL (ref 110–370)
WBC # BLD: 6.4 K/CU MM (ref 4–10.5)

## 2023-03-28 PROCEDURE — 86140 C-REACTIVE PROTEIN: CPT

## 2023-03-28 PROCEDURE — 85652 RBC SED RATE AUTOMATED: CPT

## 2023-03-28 PROCEDURE — 83540 ASSAY OF IRON: CPT

## 2023-03-28 PROCEDURE — 82728 ASSAY OF FERRITIN: CPT

## 2023-03-28 PROCEDURE — 85025 COMPLETE CBC W/AUTO DIFF WBC: CPT

## 2023-03-28 PROCEDURE — 86430 RHEUMATOID FACTOR TEST QUAL: CPT

## 2023-03-28 PROCEDURE — 80053 COMPREHEN METABOLIC PANEL: CPT

## 2023-03-28 PROCEDURE — 80061 LIPID PANEL: CPT

## 2023-03-28 PROCEDURE — 86038 ANTINUCLEAR ANTIBODIES: CPT

## 2023-03-28 PROCEDURE — 83550 IRON BINDING TEST: CPT

## 2023-03-28 PROCEDURE — 84443 ASSAY THYROID STIM HORMONE: CPT

## 2023-03-30 LAB
NUCLEAR IGG SER QL IA: NORMAL
RHEUMATOID FACTOR: <10 IU/ML (ref 0–14)

## 2023-04-21 RX ORDER — METHOCARBAMOL 750 MG/1
750 TABLET, FILM COATED ORAL 3 TIMES DAILY PRN
Qty: 90 TABLET | Refills: 3 | Status: SHIPPED | OUTPATIENT
Start: 2023-04-21

## 2023-06-09 RX ORDER — PHENAZOPYRIDINE HYDROCHLORIDE 100 MG/1
100 TABLET, FILM COATED ORAL 3 TIMES DAILY PRN
Qty: 30 TABLET | Refills: 0 | Status: SHIPPED | OUTPATIENT
Start: 2023-06-09 | End: 2024-06-08

## 2023-06-19 RX ORDER — LEVOTHYROXINE SODIUM 75 UG/1
75 CAPSULE ORAL DAILY
Qty: 90 CAPSULE | Refills: 3 | Status: SHIPPED | OUTPATIENT
Start: 2023-06-19

## 2023-07-10 RX ORDER — SUMATRIPTAN 100 MG/1
100 TABLET, FILM COATED ORAL
Qty: 9 TABLET | Refills: 5 | Status: SHIPPED | OUTPATIENT
Start: 2023-07-10 | End: 2023-07-10

## 2023-07-27 RX ORDER — PHENAZOPYRIDINE HYDROCHLORIDE 100 MG/1
100 TABLET, FILM COATED ORAL 3 TIMES DAILY PRN
Qty: 30 TABLET | Refills: 0 | Status: SHIPPED | OUTPATIENT
Start: 2023-07-27 | End: 2024-07-26

## 2023-08-31 RX ORDER — PHENAZOPYRIDINE HYDROCHLORIDE 100 MG/1
100 TABLET, FILM COATED ORAL 3 TIMES DAILY PRN
Qty: 30 TABLET | Refills: 0 | Status: SHIPPED | OUTPATIENT
Start: 2023-08-31 | End: 2024-08-30

## 2023-10-11 RX ORDER — PHENAZOPYRIDINE HYDROCHLORIDE 100 MG/1
100 TABLET, FILM COATED ORAL 3 TIMES DAILY PRN
Qty: 30 TABLET | Refills: 0 | Status: SHIPPED | OUTPATIENT
Start: 2023-10-11 | End: 2024-10-10

## 2023-10-13 RX ORDER — ONDANSETRON 4 MG/1
4 TABLET, FILM COATED ORAL EVERY 8 HOURS PRN
Qty: 30 TABLET | Refills: 0 | Status: SHIPPED | OUTPATIENT
Start: 2023-10-13

## 2023-10-13 RX ORDER — ONDANSETRON 4 MG/1
4 TABLET, ORALLY DISINTEGRATING ORAL EVERY 8 HOURS PRN
Qty: 30 TABLET | Refills: 0 | Status: SHIPPED | OUTPATIENT
Start: 2023-10-13

## 2023-12-13 RX ORDER — SUMATRIPTAN 100 MG/1
100 TABLET, FILM COATED ORAL
Qty: 9 TABLET | Refills: 5 | Status: SHIPPED | OUTPATIENT
Start: 2023-12-13 | End: 2023-12-13

## 2023-12-13 RX ORDER — ONDANSETRON 4 MG/1
4 TABLET, FILM COATED ORAL EVERY 8 HOURS PRN
Qty: 30 TABLET | Refills: 0 | Status: SHIPPED | OUTPATIENT
Start: 2023-12-13

## 2024-02-01 RX ORDER — PHENAZOPYRIDINE HYDROCHLORIDE 100 MG/1
100 TABLET, FILM COATED ORAL 3 TIMES DAILY PRN
Qty: 30 TABLET | Refills: 0 | Status: SHIPPED | OUTPATIENT
Start: 2024-02-01 | End: 2025-01-31

## 2024-02-01 RX ORDER — IBUPROFEN 800 MG/1
800 TABLET ORAL 2 TIMES DAILY PRN
Qty: 60 TABLET | Refills: 5 | Status: SHIPPED | OUTPATIENT
Start: 2024-02-01

## 2024-02-01 RX ORDER — ONDANSETRON 4 MG/1
4 TABLET, FILM COATED ORAL EVERY 8 HOURS PRN
Qty: 30 TABLET | Refills: 0 | Status: SHIPPED | OUTPATIENT
Start: 2024-02-01

## 2024-02-27 DIAGNOSIS — K21.9 GASTROESOPHAGEAL REFLUX DISEASE WITHOUT ESOPHAGITIS: ICD-10-CM

## 2024-03-03 ENCOUNTER — HOSPITAL ENCOUNTER (OUTPATIENT)
Age: 64
Discharge: HOME | End: 2024-03-03
Payer: COMMERCIAL

## 2024-03-03 LAB
ALBUMIN SERPL-MCNC: 4.5 GM/DL (ref 3.4–5)
ALP BLD-CCNC: 79 IU/L (ref 40–129)
ALT SERPL-CCNC: 14 U/L (ref 10–40)
ANION GAP SERPL CALCULATED.3IONS-SCNC: 11 MMOL/L (ref 7–16)
AST SERPL-CCNC: 20 IU/L (ref 15–37)
BASOPHILS ABSOLUTE: 0.1 K/CU MM
BASOPHILS RELATIVE PERCENT: 0.7 % (ref 0–1)
BILIRUB SERPL-MCNC: 0.3 MG/DL (ref 0–1)
BUN SERPL-MCNC: 20 MG/DL (ref 6–23)
CALCIUM SERPL-MCNC: 10 MG/DL (ref 8.3–10.6)
CHLORIDE BLD-SCNC: 99 MMOL/L (ref 99–110)
CHOLEST SERPL-MCNC: 198 MG/DL
CO2: 29 MMOL/L (ref 21–32)
CREAT SERPL-MCNC: 0.9 MG/DL (ref 0.6–1.1)
CRP SERPL HS-MCNC: 3 MG/L
DIFFERENTIAL TYPE: ABNORMAL
EOSINOPHILS ABSOLUTE: 0.4 K/CU MM
EOSINOPHILS RELATIVE PERCENT: 4.4 % (ref 0–3)
ERYTHROCYTE SEDIMENTATION RATE: 4 MM/HR (ref 0–30)
FERRITIN: 33 NG/ML (ref 15–150)
GFR SERPL CREATININE-BSD FRML MDRD: >60 ML/MIN/1.73M2
GLUCOSE SERPL-MCNC: 74 MG/DL (ref 70–99)
HCT VFR BLD CALC: 44.7 % (ref 37–47)
HDLC SERPL-MCNC: 58 MG/DL
HEMOGLOBIN: 14.2 GM/DL (ref 12.5–16)
IMMATURE NEUTROPHIL %: 0.4 % (ref 0–0.43)
IRON: 89 UG/DL (ref 37–145)
LDLC SERPL CALC-MCNC: 117 MG/DL
LYMPHOCYTES ABSOLUTE: 1.7 K/CU MM
LYMPHOCYTES RELATIVE PERCENT: 20.2 % (ref 24–44)
MCH RBC QN AUTO: 30 PG (ref 27–31)
MCHC RBC AUTO-ENTMCNC: 31.8 % (ref 32–36)
MCV RBC AUTO: 94.5 FL (ref 78–100)
MONOCYTES ABSOLUTE: 0.8 K/CU MM
MONOCYTES RELATIVE PERCENT: 8.8 % (ref 0–4)
NUCLEATED RBC %: 0 %
PCT TRANSFERRIN: 25 % (ref 10–44)
PDW BLD-RTO: 11.9 % (ref 11.7–14.9)
PLATELET # BLD: 425 K/CU MM (ref 140–440)
PMV BLD AUTO: 9.2 FL (ref 7.5–11.1)
POTASSIUM SERPL-SCNC: 4.8 MMOL/L (ref 3.5–5.1)
RBC # BLD: 4.73 M/CU MM (ref 4.2–5.4)
SEGMENTED NEUTROPHILS ABSOLUTE COUNT: 5.6 K/CU MM
SEGMENTED NEUTROPHILS RELATIVE PERCENT: 65.5 % (ref 36–66)
SODIUM BLD-SCNC: 139 MMOL/L (ref 135–145)
TOTAL IMMATURE NEUTOROPHIL: 0.03 K/CU MM
TOTAL IRON BINDING CAPACITY: 356 UG/DL (ref 250–450)
TOTAL NUCLEATED RBC: 0 K/CU MM
TOTAL PROTEIN: 7 GM/DL (ref 6.4–8.2)
TRIGL SERPL-MCNC: 115 MG/DL
TSH SERPL DL<=0.005 MIU/L-ACNC: 1.61 UIU/ML (ref 0.27–4.2)
UNSATURATED IRON BINDING CAPACITY: 267 UG/DL (ref 110–370)
WBC # BLD: 8.6 K/CU MM (ref 4–10.5)

## 2024-03-03 PROCEDURE — 82728 ASSAY OF FERRITIN: CPT

## 2024-03-03 PROCEDURE — 86038 ANTINUCLEAR ANTIBODIES: CPT

## 2024-03-03 PROCEDURE — 85025 COMPLETE CBC W/AUTO DIFF WBC: CPT

## 2024-03-03 PROCEDURE — 83550 IRON BINDING TEST: CPT

## 2024-03-03 PROCEDURE — 84443 ASSAY THYROID STIM HORMONE: CPT

## 2024-03-03 PROCEDURE — 80061 LIPID PANEL: CPT

## 2024-03-03 PROCEDURE — 86430 RHEUMATOID FACTOR TEST QUAL: CPT

## 2024-03-03 PROCEDURE — 85652 RBC SED RATE AUTOMATED: CPT

## 2024-03-03 PROCEDURE — 83540 ASSAY OF IRON: CPT

## 2024-03-03 PROCEDURE — 80053 COMPREHEN METABOLIC PANEL: CPT

## 2024-03-03 PROCEDURE — 86140 C-REACTIVE PROTEIN: CPT

## 2024-03-05 LAB — RHEUMATOID FACTOR: <10 IU/ML (ref 0–14)

## 2024-03-06 LAB — NUCLEAR IGG SER QL IA: NORMAL

## 2024-03-07 ENCOUNTER — OFFICE VISIT (OUTPATIENT)
Dept: INTERNAL MEDICINE CLINIC | Age: 64
End: 2024-03-07
Payer: COMMERCIAL

## 2024-03-07 VITALS
BODY MASS INDEX: 22.39 KG/M2 | OXYGEN SATURATION: 97 % | HEART RATE: 110 BPM | WEIGHT: 126.4 LBS | SYSTOLIC BLOOD PRESSURE: 138 MMHG | DIASTOLIC BLOOD PRESSURE: 88 MMHG

## 2024-03-07 DIAGNOSIS — K21.9 GASTROESOPHAGEAL REFLUX DISEASE WITHOUT ESOPHAGITIS: ICD-10-CM

## 2024-03-07 DIAGNOSIS — C43.61 MALIGNANT MELANOMA OF RIGHT UPPER EXTREMITY INCLUDING SHOULDER (HCC): ICD-10-CM

## 2024-03-07 DIAGNOSIS — Z12.31 ENCOUNTER FOR SCREENING MAMMOGRAM FOR MALIGNANT NEOPLASM OF BREAST: ICD-10-CM

## 2024-03-07 DIAGNOSIS — Z00.00 ENCOUNTER FOR PREVENTIVE CARE: Primary | ICD-10-CM

## 2024-03-07 DIAGNOSIS — E03.4 HYPOTHYROIDISM DUE TO ACQUIRED ATROPHY OF THYROID: ICD-10-CM

## 2024-03-07 PROCEDURE — 99396 PREV VISIT EST AGE 40-64: CPT | Performed by: INTERNAL MEDICINE

## 2024-03-07 RX ORDER — UBIDECARENONE 75 MG
50 CAPSULE ORAL DAILY
COMMUNITY

## 2024-03-07 RX ORDER — OMEPRAZOLE 20 MG/1
20 CAPSULE, DELAYED RELEASE ORAL DAILY
Qty: 90 CAPSULE | Refills: 3 | Status: SHIPPED | OUTPATIENT
Start: 2024-03-07

## 2024-03-07 RX ORDER — CLOBETASOL PROPIONATE 0.5 MG/G
OINTMENT TOPICAL
Qty: 30 G | Refills: 1 | Status: SHIPPED | OUTPATIENT
Start: 2024-03-07

## 2024-03-07 SDOH — ECONOMIC STABILITY: FOOD INSECURITY: WITHIN THE PAST 12 MONTHS, YOU WORRIED THAT YOUR FOOD WOULD RUN OUT BEFORE YOU GOT MONEY TO BUY MORE.: NEVER TRUE

## 2024-03-07 SDOH — ECONOMIC STABILITY: INCOME INSECURITY: HOW HARD IS IT FOR YOU TO PAY FOR THE VERY BASICS LIKE FOOD, HOUSING, MEDICAL CARE, AND HEATING?: NOT HARD AT ALL

## 2024-03-07 SDOH — ECONOMIC STABILITY: FOOD INSECURITY: WITHIN THE PAST 12 MONTHS, THE FOOD YOU BOUGHT JUST DIDN'T LAST AND YOU DIDN'T HAVE MONEY TO GET MORE.: NEVER TRUE

## 2024-03-07 SDOH — ECONOMIC STABILITY: HOUSING INSECURITY
IN THE LAST 12 MONTHS, WAS THERE A TIME WHEN YOU DID NOT HAVE A STEADY PLACE TO SLEEP OR SLEPT IN A SHELTER (INCLUDING NOW)?: NO

## 2024-03-07 ASSESSMENT — PATIENT HEALTH QUESTIONNAIRE - PHQ9
2. FEELING DOWN, DEPRESSED OR HOPELESS: 0
SUM OF ALL RESPONSES TO PHQ QUESTIONS 1-9: 0
SUM OF ALL RESPONSES TO PHQ QUESTIONS 1-9: 0
SUM OF ALL RESPONSES TO PHQ9 QUESTIONS 1 & 2: 0
SUM OF ALL RESPONSES TO PHQ QUESTIONS 1-9: 0
1. LITTLE INTEREST OR PLEASURE IN DOING THINGS: 0
SUM OF ALL RESPONSES TO PHQ QUESTIONS 1-9: 0

## 2024-03-07 NOTE — PROGRESS NOTES
including shoulder (HCC)    4. Hypothyroidism due to acquired atrophy of thyroid    5. Encounter for screening mammogram for malignant neoplasm of breast        PLAN:    Jyotsna was seen today for follow-up.    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        Pt declines tdap, shingles; she will arrange c-scope with GI

## 2024-03-18 RX ORDER — ONDANSETRON 4 MG/1
4 TABLET, FILM COATED ORAL EVERY 8 HOURS PRN
Qty: 30 TABLET | Refills: 0 | Status: SHIPPED | OUTPATIENT
Start: 2024-03-18

## 2024-04-17 RX ORDER — ONDANSETRON 4 MG/1
4 TABLET, ORALLY DISINTEGRATING ORAL EVERY 8 HOURS PRN
Qty: 30 TABLET | Refills: 0 | Status: SHIPPED | OUTPATIENT
Start: 2024-04-17

## 2024-04-26 NOTE — LETTER
[facility]  [address] [place] [address]  Phone: [phone]  Fax: [phone]    Rayna Bailey MD        January 11, 2018     Patient: Dwain Duverney   YOB: 1960   Date of Visit: 1/11/2018       To Whom it May Concern:    Dwain Duverney was seen in my clinic on 1/11/2018. She may return to work on Monday 1/15/18 . If you have any questions or concerns, please don't hesitate to call.     Sincerely,         Rayna Bailey MD [employer]

## 2024-05-30 RX ORDER — SUMATRIPTAN 100 MG/1
100 TABLET, FILM COATED ORAL
Qty: 9 TABLET | Refills: 5 | Status: SHIPPED | OUTPATIENT
Start: 2024-05-30 | End: 2024-05-30

## 2024-05-30 RX ORDER — IBUPROFEN 800 MG/1
800 TABLET ORAL 2 TIMES DAILY PRN
Qty: 60 TABLET | Refills: 5 | Status: SHIPPED | OUTPATIENT
Start: 2024-05-30

## 2024-05-30 RX ORDER — ONDANSETRON 4 MG/1
4 TABLET, FILM COATED ORAL EVERY 8 HOURS PRN
Qty: 30 TABLET | Refills: 0 | Status: SHIPPED | OUTPATIENT
Start: 2024-05-30

## 2024-05-31 RX ORDER — PREDNISONE 10 MG/1
TABLET ORAL
Qty: 20 TABLET | Refills: 0 | Status: SHIPPED | OUTPATIENT
Start: 2024-05-31

## 2024-06-24 RX ORDER — PHENAZOPYRIDINE HYDROCHLORIDE 100 MG/1
100 TABLET, FILM COATED ORAL 3 TIMES DAILY PRN
Qty: 30 TABLET | Refills: 0 | Status: SHIPPED | OUTPATIENT
Start: 2024-06-24

## 2024-07-16 RX ORDER — ONDANSETRON 4 MG/1
4 TABLET, FILM COATED ORAL EVERY 8 HOURS PRN
Qty: 30 TABLET | Refills: 0 | Status: SHIPPED | OUTPATIENT
Start: 2024-07-16

## 2024-08-20 RX ORDER — ONDANSETRON 4 MG/1
4 TABLET, FILM COATED ORAL EVERY 8 HOURS PRN
Qty: 30 TABLET | Refills: 0 | Status: SHIPPED | OUTPATIENT
Start: 2024-08-20

## 2024-09-05 RX ORDER — LEVOTHYROXINE SODIUM 75 UG/1
75 CAPSULE ORAL DAILY
Qty: 90 CAPSULE | Refills: 1 | Status: SHIPPED | OUTPATIENT
Start: 2024-09-05

## 2024-09-25 RX ORDER — PHENAZOPYRIDINE HYDROCHLORIDE 100 MG/1
100 TABLET, FILM COATED ORAL 3 TIMES DAILY PRN
Qty: 30 TABLET | Refills: 0 | Status: SHIPPED | OUTPATIENT
Start: 2024-09-25

## 2024-10-11 RX ORDER — ONDANSETRON 4 MG/1
4 TABLET, FILM COATED ORAL EVERY 8 HOURS PRN
Qty: 30 TABLET | Refills: 0 | Status: SHIPPED | OUTPATIENT
Start: 2024-10-11

## 2024-10-30 ENCOUNTER — OFFICE VISIT (OUTPATIENT)
Dept: INTERNAL MEDICINE CLINIC | Age: 64
End: 2024-10-30

## 2024-10-30 VITALS
SYSTOLIC BLOOD PRESSURE: 122 MMHG | HEIGHT: 63 IN | DIASTOLIC BLOOD PRESSURE: 82 MMHG | BODY MASS INDEX: 22.36 KG/M2 | WEIGHT: 126.2 LBS | HEART RATE: 80 BPM | OXYGEN SATURATION: 95 %

## 2024-10-30 DIAGNOSIS — J40 BRONCHITIS: Primary | ICD-10-CM

## 2024-10-30 RX ORDER — PREDNISONE 10 MG/1
TABLET ORAL
Qty: 20 TABLET | Refills: 0 | Status: SHIPPED | OUTPATIENT
Start: 2024-10-30

## 2024-10-30 RX ORDER — AZITHROMYCIN 250 MG/1
TABLET, FILM COATED ORAL
Qty: 6 TABLET | Refills: 0 | Status: SHIPPED | OUTPATIENT
Start: 2024-10-30 | End: 2024-11-09

## 2024-10-30 RX ORDER — BENZONATATE 200 MG/1
200 CAPSULE ORAL 3 TIMES DAILY PRN
Qty: 30 CAPSULE | Refills: 0 | Status: SHIPPED | OUTPATIENT
Start: 2024-10-30 | End: 2024-11-06

## 2024-10-30 NOTE — PROGRESS NOTES
Jyotsna Valiente-Denunoapryl  1960  10/30/24    SUBJECTIVE:      Monday pt developed sore throat, hoarseness, cough productive pf yellow mucous, rhinorrhea productive of yellow mucous.    She denies SOB, wheezing, F/C, eat pain, sinus pain.    She has used tylenol, mucinex, tesalon    OBJECTIVE:    /82   Pulse 80   Ht 1.6 m (5' 3\")   Wt 57.2 kg (126 lb 3.2 oz)   SpO2 95%   BMI 22.36 kg/m²     Physical Exam  Constitutional:       Appearance: She is well-developed.   HENT:      Right Ear: External ear normal.      Left Ear: External ear normal.      Nose: Nose normal.      Mouth/Throat:      Pharynx: No oropharyngeal exudate.   Eyes:      Conjunctiva/sclera: Conjunctivae normal.   Cardiovascular:      Rate and Rhythm: Normal rate and regular rhythm.      Heart sounds: Normal heart sounds.   Pulmonary:      Effort: Pulmonary effort is normal.      Breath sounds: Normal breath sounds.   Lymphadenopathy:      Cervical: No cervical adenopathy.   Neurological:      Mental Status: She is alert.         ASSESSMENT:    1. Bronchitis        PLAN:    Jyotsna was seen today for cough and congestion.    Diagnoses and all orders for this visit:    Bronchitis-will treat with prednisone, azithromycin, and Tessalon.  Also encouraged use of sinus rinse  -     predniSONE (DELTASONE) 10 MG tablet; Take 4 tablets daily for 2 days, then 3 tablets daily for 2 days, then two tablets daily for 2 days, then one tablet daily for 2 days  -     benzonatate (TESSALON) 200 MG capsule; Take 1 capsule by mouth 3 times daily as needed for Cough  -     azithromycin (ZITHROMAX) 250 MG tablet; 500mg on day 1 followed by 250mg on days 2 - 5

## 2024-10-31 ENCOUNTER — PATIENT MESSAGE (OUTPATIENT)
Dept: INTERNAL MEDICINE CLINIC | Age: 64
End: 2024-10-31

## 2024-10-31 DIAGNOSIS — J40 BRONCHITIS: Primary | ICD-10-CM

## 2024-11-01 RX ORDER — CODEINE PHOSPHATE/GUAIFENESIN 10-100MG/5
5 LIQUID (ML) ORAL 4 TIMES DAILY PRN
Qty: 120 ML | Refills: 0 | Status: SHIPPED | OUTPATIENT
Start: 2024-11-01 | End: 2024-11-08

## 2024-11-22 RX ORDER — SUMATRIPTAN SUCCINATE 100 MG/1
100 TABLET ORAL
Qty: 9 TABLET | Refills: 5 | Status: SHIPPED | OUTPATIENT
Start: 2024-11-22 | End: 2024-11-22

## 2024-11-22 RX ORDER — ONDANSETRON 4 MG/1
4 TABLET, FILM COATED ORAL EVERY 8 HOURS PRN
Qty: 30 TABLET | Refills: 0 | Status: SHIPPED | OUTPATIENT
Start: 2024-11-22

## 2024-12-23 RX ORDER — ONDANSETRON 4 MG/1
4 TABLET, FILM COATED ORAL EVERY 8 HOURS PRN
Qty: 30 TABLET | Refills: 0 | Status: SHIPPED | OUTPATIENT
Start: 2024-12-23

## 2024-12-23 RX ORDER — DICYCLOMINE HYDROCHLORIDE 10 MG/1
10 CAPSULE ORAL 4 TIMES DAILY
Qty: 120 CAPSULE | Refills: 3 | Status: SHIPPED | OUTPATIENT
Start: 2024-12-23 | End: 2024-12-23

## 2024-12-23 RX ORDER — DICYCLOMINE HYDROCHLORIDE 10 MG/1
CAPSULE ORAL
Qty: 360 CAPSULE | Refills: 1 | Status: SHIPPED | OUTPATIENT
Start: 2024-12-23

## 2025-01-17 RX ORDER — ONDANSETRON 4 MG/1
4 TABLET, FILM COATED ORAL EVERY 8 HOURS PRN
Qty: 30 TABLET | Refills: 0 | Status: SHIPPED | OUTPATIENT
Start: 2025-01-17

## 2025-02-18 RX ORDER — ONDANSETRON 4 MG/1
4 TABLET, FILM COATED ORAL EVERY 8 HOURS PRN
Qty: 30 TABLET | Refills: 0 | Status: SHIPPED | OUTPATIENT
Start: 2025-02-18

## 2025-03-18 ENCOUNTER — COMMUNITY OUTREACH (OUTPATIENT)
Dept: INTERNAL MEDICINE CLINIC | Age: 65
End: 2025-03-18

## 2025-03-24 DIAGNOSIS — E03.4 HYPOTHYROIDISM DUE TO ACQUIRED ATROPHY OF THYROID: ICD-10-CM

## 2025-03-24 DIAGNOSIS — Z00.00 ENCOUNTER FOR PREVENTIVE CARE: Primary | ICD-10-CM

## 2025-03-24 RX ORDER — PHENAZOPYRIDINE HYDROCHLORIDE 100 MG/1
100 TABLET, FILM COATED ORAL 3 TIMES DAILY PRN
Qty: 30 TABLET | Refills: 0 | Status: SHIPPED | OUTPATIENT
Start: 2025-03-24

## 2025-03-24 RX ORDER — ONDANSETRON 4 MG/1
4 TABLET, FILM COATED ORAL EVERY 8 HOURS PRN
Qty: 30 TABLET | Refills: 0 | Status: SHIPPED | OUTPATIENT
Start: 2025-03-24

## 2025-04-03 RX ORDER — LEVOTHYROXINE SODIUM 75 UG/1
75 CAPSULE ORAL DAILY
Qty: 90 CAPSULE | Refills: 1 | Status: SHIPPED | OUTPATIENT
Start: 2025-04-03

## 2025-04-08 ENCOUNTER — HOSPITAL ENCOUNTER (OUTPATIENT)
Age: 65
Discharge: HOME | End: 2025-04-08
Payer: COMMERCIAL

## 2025-04-08 LAB
ALBUMIN SERPL-MCNC: 4.5 G/DL (ref 3.4–5)
ALBUMIN/GLOB SERPL: 1.6 {RATIO} (ref 1.1–2.2)
ALP SERPL-CCNC: 89 U/L (ref 40–129)
ALT SERPL-CCNC: 18 U/L (ref 10–40)
ANION GAP SERPL CALCULATED.3IONS-SCNC: 11 MMOL/L (ref 9–17)
AST SERPL-CCNC: 28 U/L (ref 15–37)
BILIRUB SERPL-MCNC: 0.3 MG/DL (ref 0–1)
BUN SERPL-MCNC: 20 MG/DL (ref 7–20)
CALCIUM SERPL-MCNC: 9.9 MG/DL (ref 8.3–10.6)
CHLORIDE SERPL-SCNC: 99 MMOL/L (ref 99–110)
CHOLEST SERPL-MCNC: 188 MG/DL (ref 125–199)
CO2 SERPL-SCNC: 28 MMOL/L (ref 21–32)
CREAT SERPL-MCNC: 0.9 MG/DL (ref 0.6–1.2)
ERYTHROCYTE [DISTWIDTH] IN BLOOD BY AUTOMATED COUNT: 12.6 % (ref 11.7–14.9)
GFR, ESTIMATED: 66 ML/MIN/1.73M2
GLUCOSE SERPL-MCNC: 90 MG/DL (ref 74–99)
HCT VFR BLD AUTO: 45.5 % (ref 37–47)
HDLC SERPL-MCNC: 69 MG/DL
HGB BLD-MCNC: 14.7 G/DL (ref 12.5–16)
LDLC SERPL CALC-MCNC: 95 MG/DL
MCH RBC QN AUTO: 30.3 PG (ref 27–31)
MCHC RBC AUTO-ENTMCNC: 32.3 G/DL (ref 32–36)
MCV RBC AUTO: 93.8 FL (ref 78–100)
PLATELET # BLD AUTO: 430 K/UL (ref 140–440)
PMV BLD AUTO: 9.3 FL (ref 7.5–11.1)
POTASSIUM SERPL-SCNC: 4.5 MMOL/L (ref 3.5–5.1)
PROT SERPL-MCNC: 7.2 G/DL (ref 6.4–8.2)
RBC # BLD AUTO: 4.85 M/UL (ref 4.2–5.4)
SODIUM SERPL-SCNC: 137 MMOL/L (ref 136–145)
TRIGL SERPL-MCNC: 120 MG/DL
TSH SERPL DL<=0.05 MIU/L-ACNC: 2.41 UIU/ML (ref 0.27–4.2)
VIT B12 SERPL-MCNC: 481 PG/ML (ref 211–911)
WBC OTHER # BLD: 7.7 K/UL (ref 4–10.5)

## 2025-04-08 PROCEDURE — 85027 COMPLETE CBC AUTOMATED: CPT

## 2025-04-08 PROCEDURE — 80053 COMPREHEN METABOLIC PANEL: CPT

## 2025-04-08 PROCEDURE — 84443 ASSAY THYROID STIM HORMONE: CPT

## 2025-04-08 PROCEDURE — 80061 LIPID PANEL: CPT

## 2025-04-08 PROCEDURE — 82607 VITAMIN B-12: CPT

## 2025-04-09 ENCOUNTER — OFFICE VISIT (OUTPATIENT)
Dept: INTERNAL MEDICINE CLINIC | Age: 65
End: 2025-04-09
Payer: COMMERCIAL

## 2025-04-09 VITALS
HEART RATE: 101 BPM | SYSTOLIC BLOOD PRESSURE: 128 MMHG | RESPIRATION RATE: 18 BRPM | DIASTOLIC BLOOD PRESSURE: 88 MMHG | WEIGHT: 128 LBS | OXYGEN SATURATION: 95 % | BODY MASS INDEX: 22.67 KG/M2

## 2025-04-09 DIAGNOSIS — Z12.31 ENCOUNTER FOR SCREENING MAMMOGRAM FOR MALIGNANT NEOPLASM OF BREAST: ICD-10-CM

## 2025-04-09 DIAGNOSIS — Z12.11 COLON CANCER SCREENING: ICD-10-CM

## 2025-04-09 DIAGNOSIS — Z00.00 ENCOUNTER FOR PREVENTIVE CARE: Primary | ICD-10-CM

## 2025-04-09 DIAGNOSIS — C43.61 MALIGNANT MELANOMA OF RIGHT UPPER EXTREMITY INCLUDING SHOULDER: ICD-10-CM

## 2025-04-09 DIAGNOSIS — I73.00 RAYNAUD'S DISEASE WITHOUT GANGRENE: ICD-10-CM

## 2025-04-09 DIAGNOSIS — E03.4 HYPOTHYROIDISM DUE TO ACQUIRED ATROPHY OF THYROID: ICD-10-CM

## 2025-04-09 DIAGNOSIS — K21.9 GASTROESOPHAGEAL REFLUX DISEASE WITHOUT ESOPHAGITIS: ICD-10-CM

## 2025-04-09 PROCEDURE — 99396 PREV VISIT EST AGE 40-64: CPT | Performed by: INTERNAL MEDICINE

## 2025-04-09 RX ORDER — AMLODIPINE BESYLATE 2.5 MG/1
2.5 TABLET ORAL DAILY
Qty: 90 TABLET | Refills: 3 | Status: SHIPPED | OUTPATIENT
Start: 2025-04-09

## 2025-04-09 SDOH — ECONOMIC STABILITY: FOOD INSECURITY: WITHIN THE PAST 12 MONTHS, YOU WORRIED THAT YOUR FOOD WOULD RUN OUT BEFORE YOU GOT MONEY TO BUY MORE.: NEVER TRUE

## 2025-04-09 SDOH — ECONOMIC STABILITY: FOOD INSECURITY: WITHIN THE PAST 12 MONTHS, THE FOOD YOU BOUGHT JUST DIDN'T LAST AND YOU DIDN'T HAVE MONEY TO GET MORE.: NEVER TRUE

## 2025-04-09 ASSESSMENT — PATIENT HEALTH QUESTIONNAIRE - PHQ9
SUM OF ALL RESPONSES TO PHQ QUESTIONS 1-9: 0
2. FEELING DOWN, DEPRESSED OR HOPELESS: NOT AT ALL
SUM OF ALL RESPONSES TO PHQ QUESTIONS 1-9: 0
1. LITTLE INTEREST OR PLEASURE IN DOING THINGS: NOT AT ALL

## 2025-04-09 NOTE — PROGRESS NOTES
Jyotsna CAREY SteffanieRadha  1960 04/09/25    SUBJECTIVE:      Pt seems to be having more raynaud's symptoms in her hands and feet. The fingers will turn white, red, and blue. She has numbness in her hands denies pain.     She uses the imitrex 5-6 times monthly for migraine.    Sunday night pt woke with a cramp in the right calf. She then developed similar symptoms on the left.     She continues to exercise 3 times weekly, also walks for exercise.     She has not followed with the dermatologist for the melanoma.     Pt continues on synthroid for hypothyroidism.    Patient's reflux well controlled on current medications. Patient denies any blood in stool black stool, heartburn, reflux.     OBJECTIVE:    /88   Pulse (!) 101   Resp 18   Wt 58.1 kg (128 lb)   SpO2 95%   BMI 22.67 kg/m²     Physical Exam  Constitutional:       Appearance: She is well-developed.   Eyes:      General: No scleral icterus.     Conjunctiva/sclera: Conjunctivae normal.   Neck:      Thyroid: No thyromegaly.      Trachea: No tracheal deviation.   Cardiovascular:      Rate and Rhythm: Normal rate and regular rhythm.      Heart sounds: No murmur heard.     No friction rub. No gallop.   Pulmonary:      Effort: No respiratory distress.      Breath sounds: No wheezing or rales.   Abdominal:      General: Bowel sounds are normal. There is no distension.      Palpations: Abdomen is soft. There is no hepatomegaly or mass.      Tenderness: There is no abdominal tenderness. There is no guarding or rebound.   Musculoskeletal:      Cervical back: Neck supple.   Lymphadenopathy:      Cervical: No cervical adenopathy.   Skin:     Nails: There is no clubbing.   Neurological:      Mental Status: She is alert and oriented to person, place, and time.   Psychiatric:         Behavior: Behavior normal.         Judgment: Judgment normal.         ASSESSMENT:    1. Encounter for preventive care    2. Malignant melanoma of right upper extremity including

## 2025-04-14 ENCOUNTER — TELEPHONE (OUTPATIENT)
Dept: GASTROENTEROLOGY | Age: 65
End: 2025-04-14

## 2025-04-16 ENCOUNTER — PATIENT MESSAGE (OUTPATIENT)
Dept: INTERNAL MEDICINE CLINIC | Age: 65
End: 2025-04-16

## 2025-04-16 DIAGNOSIS — I73.00 RAYNAUD'S PHENOMENON WITHOUT GANGRENE: Primary | ICD-10-CM

## 2025-04-21 ENCOUNTER — TELEPHONE (OUTPATIENT)
Dept: GASTROENTEROLOGY | Age: 65
End: 2025-04-21

## 2025-04-24 RX ORDER — ONDANSETRON 4 MG/1
4 TABLET, FILM COATED ORAL EVERY 8 HOURS PRN
Qty: 30 TABLET | Refills: 0 | Status: SHIPPED | OUTPATIENT
Start: 2025-04-24

## 2025-04-25 ENCOUNTER — TELEPHONE (OUTPATIENT)
Dept: GASTROENTEROLOGY | Age: 65
End: 2025-04-25

## 2025-05-09 DIAGNOSIS — K21.9 GASTROESOPHAGEAL REFLUX DISEASE WITHOUT ESOPHAGITIS: ICD-10-CM

## 2025-05-09 RX ORDER — OMEPRAZOLE 20 MG/1
20 CAPSULE, DELAYED RELEASE ORAL DAILY
Qty: 90 CAPSULE | Refills: 3 | Status: SHIPPED | OUTPATIENT
Start: 2025-05-09

## 2025-05-19 ENCOUNTER — TELEMEDICINE (OUTPATIENT)
Dept: INTERNAL MEDICINE CLINIC | Age: 65
End: 2025-05-19
Payer: COMMERCIAL

## 2025-05-19 DIAGNOSIS — J02.9 SORE THROAT: Primary | ICD-10-CM

## 2025-05-19 PROCEDURE — 99213 OFFICE O/P EST LOW 20 MIN: CPT | Performed by: INTERNAL MEDICINE

## 2025-05-19 RX ORDER — PREDNISONE 10 MG/1
TABLET ORAL
Qty: 20 TABLET | Refills: 0 | Status: SHIPPED | OUTPATIENT
Start: 2025-05-19

## 2025-05-19 RX ORDER — FLUTICASONE PROPIONATE 50 MCG
2 SPRAY, SUSPENSION (ML) NASAL DAILY
Qty: 16 G | Refills: 0 | Status: SHIPPED | OUTPATIENT
Start: 2025-05-19

## 2025-05-19 RX ORDER — AZITHROMYCIN 250 MG/1
TABLET, FILM COATED ORAL
Qty: 6 TABLET | Refills: 0 | Status: SHIPPED | OUTPATIENT
Start: 2025-05-19 | End: 2025-05-29

## 2025-05-19 NOTE — PROGRESS NOTES
2025    TELEHEALTH EVALUATION -- Audio/Visual    HPI:    Jyotsna Handley (:  1960) has requested an audio/video evaluation for the following concern(s):    Pt complains of 3 days of hoarseness, sore throat, cough productive of thick yellow mucous, ear popping.    She denies F/C, sinus pain, rhinorrhea, nasal congestion, SOB, wheezingN/V, diarrhea.     She has used mucinex with some benefit, tylenol.     Review of Systems    Prior to Visit Medications    Medication Sig Taking? Authorizing Provider   fluticasone (FLONASE) 50 MCG/ACT nasal spray 2 sprays by Each Nostril route daily Yes Pedrito Peoples MD   predniSONE (DELTASONE) 10 MG tablet Take 4 tablets daily for 2 days, then 3 tablets daily for 2 days, then two tablets daily for 2 days, then one tablet daily for 2 days Yes Pedrito Peoples MD   azithromycin (ZITHROMAX) 250 MG tablet 500mg on day 1 followed by 250mg on days 2 - 5 Yes Pedrito Peoples MD   omeprazole (PRILOSEC) 20 MG delayed release capsule Take 1 capsule by mouth daily Yes Pedrito Peoples MD   ondansetron (ZOFRAN) 4 MG tablet Take 1 tablet by mouth every 8 hours as needed for Nausea or Vomiting Yes Pedrito Peoples MD   Levothyroxine Sodium 75 MCG CAPS Take 75 mcg by mouth Daily Yes Pedrito Peoples MD   phenazopyridine (PYRIDIUM) 100 MG tablet Take 1 tablet by mouth 3 times daily as needed for Pain Yes Pedrito Peoples MD   dicyclomine (BENTYL) 10 MG capsule TAKE 1 CAPSULE BY MOUTH FOUR TIMES DAILY Yes Pedrito Peoples MD   SUMAtriptan (IMITREX) 100 MG tablet Take 1 tablet by mouth once as needed for Migraine Yes Pedrito Peoples MD   ibuprofen (ADVIL;MOTRIN) 800 MG tablet Take 1 tablet by mouth 2 times daily as needed for Pain Yes Pedrito Peoples MD   lidocaine (LIDODERM) 5 % Place 1 patch onto the skin daily 12 hours on, 12 hours off. Yes Provider, MD [place]

## 2025-05-21 RX ORDER — ONDANSETRON 4 MG/1
4 TABLET, ORALLY DISINTEGRATING ORAL EVERY 8 HOURS PRN
Qty: 30 TABLET | Refills: 0 | Status: SHIPPED | OUTPATIENT
Start: 2025-05-21

## 2025-06-09 RX ORDER — IBUPROFEN 800 MG/1
800 TABLET, FILM COATED ORAL 2 TIMES DAILY PRN
Qty: 60 TABLET | Refills: 5 | Status: SHIPPED | OUTPATIENT
Start: 2025-06-09

## 2025-06-30 RX ORDER — PHENAZOPYRIDINE HYDROCHLORIDE 100 MG/1
100 TABLET, FILM COATED ORAL 3 TIMES DAILY PRN
Qty: 30 TABLET | Refills: 0 | Status: SHIPPED | OUTPATIENT
Start: 2025-06-30

## 2025-06-30 RX ORDER — ONDANSETRON 4 MG/1
4 TABLET, FILM COATED ORAL EVERY 8 HOURS PRN
Qty: 30 TABLET | Refills: 0 | Status: SHIPPED | OUTPATIENT
Start: 2025-06-30

## 2025-07-02 RX ORDER — SUMATRIPTAN SUCCINATE 100 MG/1
100 TABLET ORAL
Qty: 9 TABLET | Refills: 5 | Status: SHIPPED | OUTPATIENT
Start: 2025-07-02

## 2025-08-01 RX ORDER — PREDNISONE 10 MG/1
TABLET ORAL
Qty: 20 TABLET | Refills: 0 | Status: SHIPPED | OUTPATIENT
Start: 2025-08-01

## 2025-08-01 RX ORDER — ONDANSETRON 4 MG/1
4 TABLET, FILM COATED ORAL EVERY 8 HOURS PRN
Qty: 30 TABLET | Refills: 0 | Status: SHIPPED | OUTPATIENT
Start: 2025-08-01

## 2025-08-07 ASSESSMENT — RHEUMATOLOGY NEW PATIENT QUESTIONNAIRE
LOSS OF VISION: N
PAIN OR BURNING ON URINATION: N
EASILY LOSING TEMPER: N
EYE PAIN: N
SUN SENSITIVE (SUN ALLERGY): N
ABNORMAL URINE: N
FAINTING: N
PERSISTENT DIARRHEA: N
NODULES/BUMPS: N
UNUSUAL BLEEDING: N
HEARTBURN OR REFLUX: N
ANEMIA: N
JAUNDICE: N
SHORTNESS OF BREATH: N
AGITATION: N
UNEXPLAINED WEIGHT CHANGE: N
COLOR CHANGES OF HANDS OR FEET IN THE COLD: Y
EYE DRYNESS: N
STOMACH PAIN: N
RASH: N
LOSS OF CONSCIOUSNESS: N
EYE REDNESS: N
NAUSEA: N
UNUSUALLY RAPID OR SLOWED HEART RATE: N
VOMITING OF BLOOD OR COFFEE GROUND CONSISTENCY MATERIAL: N
NUMBNESS OR TINGLING IN HANDS OR FEET: Y
EASY BRUISING: Y
NIGHT SWEATS: N
DIFFICULTY SWALLOWING: N
INCREASED SUSCEPTIBILITY TO INFECTION: N
ANXIETY: N
SWOLLEN LEGS OR FEET: N
JOINT PAIN: Y
BEHAVIORAL CHANGES: N
SKIN REDNESS: N
MEMORY LOSS: N
VAGINAL DRYNESS: N
MORNING STIFFNESS: N
FEVER: N
JOINT SWELLING: N
DOUBLE OR BLURRED VISION: N
CHEST PAIN: N
BLOOD IN STOOLS: N
UNUSUAL FATIGUE: N
DIFFICULTY STAYING ASLEEP: N
UNEXPLAINED HEARING LOSS: N
DIFFICULTY BREATHING LYING DOWN: N
SEIZURES: N
HOARSE VOICE: N
BLACK STOOLS: N
EXCESSIVE HAIR LOSS (MORE THAN YOUR NORM): N
SORES IN MOUTH OR NOSE: N
SWOLLEN OR TENDER GLANDS: N
HEADACHES: Y
COUGH: N
DIFFICULTY FALLING ASLEEP: N
SKIN TIGHTNESS: N
DRYNESS OF MOUTH: N
MUSCLE WEAKNESS: Y
RASH OR ULCERS: N
DEPRESSION: N

## 2025-08-08 ENCOUNTER — OFFICE VISIT (OUTPATIENT)
Age: 65
End: 2025-08-08
Payer: COMMERCIAL

## 2025-08-08 VITALS
BODY MASS INDEX: 23.74 KG/M2 | SYSTOLIC BLOOD PRESSURE: 140 MMHG | WEIGHT: 134 LBS | DIASTOLIC BLOOD PRESSURE: 82 MMHG | HEART RATE: 93 BPM | OXYGEN SATURATION: 98 %

## 2025-08-08 DIAGNOSIS — M81.8 OTHER OSTEOPOROSIS WITHOUT CURRENT PATHOLOGICAL FRACTURE: ICD-10-CM

## 2025-08-08 DIAGNOSIS — I73.00 RAYNAUD'S PHENOMENON WITHOUT GANGRENE: Primary | ICD-10-CM

## 2025-08-08 PROCEDURE — 99205 OFFICE O/P NEW HI 60 MIN: CPT | Performed by: STUDENT IN AN ORGANIZED HEALTH CARE EDUCATION/TRAINING PROGRAM

## 2025-08-08 RX ORDER — ERGOCALCIFEROL 1.25 MG/1
5000 CAPSULE, LIQUID FILLED ORAL DAILY
COMMUNITY

## 2025-09-02 RX ORDER — ONDANSETRON 4 MG/1
4 TABLET, FILM COATED ORAL EVERY 8 HOURS PRN
Qty: 30 TABLET | Refills: 0 | Status: SHIPPED | OUTPATIENT
Start: 2025-09-02